# Patient Record
Sex: FEMALE | Race: WHITE | Employment: OTHER | URBAN - METROPOLITAN AREA
[De-identification: names, ages, dates, MRNs, and addresses within clinical notes are randomized per-mention and may not be internally consistent; named-entity substitution may affect disease eponyms.]

---

## 2017-01-04 DIAGNOSIS — M48.061 LUMBAR SPINAL STENOSIS: ICD-10-CM

## 2017-01-04 DIAGNOSIS — M54.16 RADICULOPATHY, LUMBAR REGION: ICD-10-CM

## 2017-01-04 DIAGNOSIS — M47.816 SPONDYLOSIS WITHOUT MYELOPATHY OR RADICULOPATHY, LUMBAR REGION: ICD-10-CM

## 2017-01-04 DIAGNOSIS — M51.36 OTHER INTERVERTEBRAL DISC DEGENERATION, LUMBAR REGION: ICD-10-CM

## 2017-01-04 RX ORDER — DULOXETIN HYDROCHLORIDE 20 MG/1
CAPSULE, DELAYED RELEASE ORAL
Qty: 90 CAP | Refills: 0 | Status: SHIPPED | OUTPATIENT
Start: 2017-01-04 | End: 2017-03-29 | Stop reason: SDUPTHER

## 2017-02-06 ENCOUNTER — OFFICE VISIT (OUTPATIENT)
Dept: ORTHOPEDIC SURGERY | Age: 77
End: 2017-02-06

## 2017-02-06 VITALS
RESPIRATION RATE: 22 BRPM | HEART RATE: 76 BPM | WEIGHT: 175 LBS | BODY MASS INDEX: 34.18 KG/M2 | SYSTOLIC BLOOD PRESSURE: 118 MMHG | DIASTOLIC BLOOD PRESSURE: 76 MMHG

## 2017-02-06 DIAGNOSIS — M48.061 SPINAL STENOSIS, LUMBAR: ICD-10-CM

## 2017-02-06 DIAGNOSIS — M47.816 SPONDYLOSIS WITHOUT MYELOPATHY OR RADICULOPATHY, LUMBAR REGION: ICD-10-CM

## 2017-02-06 DIAGNOSIS — M54.16 RADICULOPATHY, LUMBAR REGION: ICD-10-CM

## 2017-02-06 DIAGNOSIS — M51.36 OTHER INTERVERTEBRAL DISC DEGENERATION, LUMBAR REGION: Primary | ICD-10-CM

## 2017-02-06 RX ORDER — DULOXETIN HYDROCHLORIDE 30 MG/1
30 CAPSULE, DELAYED RELEASE ORAL DAILY
Qty: 90 CAP | Refills: 0 | Status: SHIPPED | OUTPATIENT
Start: 2017-02-06

## 2017-02-06 NOTE — PROGRESS NOTES
[facility]  [address]  [address], [place]   Phone: [phone]  Fax: [phone]        PROGRESS NOTE      HISTORY OF PRESENT ILLNESS:  The patient is a 78 y.o. female and was seen today for follow up of chronic bilateral buttocks extending posteriorly into BLE (R>L) worse since March 2016. She states pain is exacerbated with ambulation and describes symptoms consistent with stenosis. Note from Dr. Ramakrishna Christensen dated 9/6/16 indicating patient was seen with c/o bilateral knee pain and described pain radiating from buttocks into bilateral knees. Dr. Ramakrishna Christensen started her on Neurontin and gave her a prescription for Percocet. Patient states she had an allergic reaction to PERCOCET. Patient reports discontinuing NEURONTIN 300 mg TID as she believes her pain increased with medication. Her pain subsided after d/c medication. She completed Medrol Dosepak with slight benefit. She states she is tolerating Cymbalta 20 mg with some relief. She also notes a slight increase in standing and walking tolerance. She denies hx of lumbar spinal surgery or blocks. She has not attended physical therapy/chiropractor. She denies hx of glaucoma, DM, stomach ulcers, or bleeding disorders. Patient denies change in bowel or bladder habits. Patient denies fever, weight loss, or skin changes. She is not a candidate for an MRI due to clip in her brain from surgery in 1998. Patient is LH dominant. Preliminary reading of lumbar plain films revealed dextrorotary scoliosis, apex L2 convex to the right. Grade 1 retrolisthesis of L3 on L4. Panlumbar degenerative disc disease. Anterior osteophytes at all levels of lumbar spine. No acute pathology identified. Lumbar spine CT dated 10/5/16 reviewed. Per report, no acute osseous finding. Interval progression of scoliotic curvature and asymmetric left-sided degenerative spondylosis L2,-L4 and right-sided degenerative spondylosis L4/5.  Severe canal stenosis L4/5, moderate canal stenosis L3/4, mild to moderate canal stenosis L2/3. Associated moderate right lateral recess stenosis L4/5, mild to moderate foraminal stenosis at these levels. Persistent severe asymmetric right-sided degenerative spondylosis L5/S1 with severe right foraminal stenosis. At her last clinical appointment, patient wished to continue current treatment plan. I provided patient with a refill of Cymbalta 20 mg. The patient returns today with pain location and distribution remain unchanged. She rates pain 1-7/10, elevated since her last visit (0-3/10). Despite her current pain level, she reports she is doing well overall. She continues with Cymbalta 20 mg daily with slight relief. Patient reports Cymbalta's benefits do not last throughout the day.  reviewed. Past Medical History   Diagnosis Date    Brain aneurysm     Chronic obstructive pulmonary disease (HCC)     Hypertension         Social History     Social History    Marital status:      Spouse name: N/A    Number of children: N/A    Years of education: N/A     Occupational History    Not on file. Social History Main Topics    Smoking status: Former Smoker    Smokeless tobacco: Never Used    Alcohol use No    Drug use: No    Sexual activity: Not on file     Other Topics Concern    Not on file     Social History Narrative       Current Outpatient Prescriptions   Medication Sig Dispense Refill    DULoxetine (CYMBALTA) 30 mg capsule Take 1 Cap by mouth daily. 90 Cap 0    DULoxetine (CYMBALTA) 20 mg capsule TAKE 1 CAPSULE EVERY DAY 90 Cap 0    rosuvastatin (CRESTOR) 5 mg tablet       amLODIPine (NORVASC) 5 mg tablet       umeclidinium (INCRUSE ELLIPTA) 62.5 mcg/actuation inhaler Take 1 Puff by inhalation daily.  FLUTICASONE/VILANTEROL (BREO ELLIPTA IN) Take  by inhalation.  albuterol (VENTOLIN HFA) 90 mcg/actuation inhaler Take  by inhalation.  rOPINIRole (REQUIP) 2 mg tablet Take  by mouth nightly.  omeprazole (PRILOSEC) 20 mg capsule Take 20 mg by mouth daily.  baclofen (LIORESAL) 10 mg tablet Take  by mouth three (3) times daily.  metoprolol tartrate (LOPRESSOR) 25 mg tablet Take  by mouth two (2) times a day.  furosemide (LASIX) 40 mg tablet Take  by mouth daily.  aspirin delayed-release 81 mg tablet Take  by mouth daily.  spironolactone (ALDACTONE) 25 mg tablet Take  by mouth daily.  losartan (COZAAR) 100 mg tablet Take 100 mg by mouth daily.  meloxicam (MOBIC) 15 mg tablet Take 15 mg by mouth daily.  gabapentin (NEURONTIN) 300 mg capsule Take 1 Cap by mouth three (3) times daily (with meals). 90 Cap 1    gabapentin (NEURONTIN) 100 mg capsule TAKE ONE CAPSULE BY MOUTH THREE TIMES DAILY 90 Cap 0    traMADol (ULTRAM) 50 mg tablet Take 1 Tab by mouth every six (6) hours as needed for Pain. Max Daily Amount: 200 mg. 40 Tab 0    oxyCODONE-acetaminophen (PERCOCET)  mg per tablet Take 1 Tab by mouth every six (6) hours as needed for Pain. Max Daily Amount: 4 Tabs. 40 Tab 0    atorvastatin (LIPITOR) 10 mg tablet Take  by mouth daily.  gabapentin (NEURONTIN) 100 mg capsule Take 1 Cap by mouth three (3) times daily. 90 Cap 0       Allergies   Allergen Reactions    Codeine Nausea Only    Phenobarbital Hives    Tetanus And Diphtheria Toxoids Swelling    Vicodin [Hydrocodone-Acetaminophen] Nausea Only          PHYSICAL EXAMINATION    Visit Vitals    /76    Pulse 76    Resp 22    Wt 175 lb (79.4 kg)    BMI 34.18 kg/m2       CONSTITUTIONAL: NAD, A&O x 3  SENSATION: Intact to light touch throughout  RANGE OF MOTION: The patient has full passive range of motion in all four extremities.   MOTOR:  Straight Leg Raise: Negative, bilateral               Hip Flex Knee Ext Knee Flex Ankle DF GTE Ankle PF Tone   Right +4/5 +4/5 +4/5 +4/5 +4/5 +4/5 +4/5   Left +4/5 +4/5 +4/5 +4/5 +4/5 +4/5 +4/5       ASSESSMENT   Alma Johnson was seen today for back pain.    Diagnoses and all orders for this visit:    Other intervertebral disc degeneration, lumbar region    Radiculopathy, lumbar region    Spondylosis without myelopathy or radiculopathy, lumbar region    Spinal stenosis, lumbar    Other orders  -     DULoxetine (CYMBALTA) 30 mg capsule; Take 1 Cap by mouth daily. IMPRESSION AND PLAN:  I will increase her Cymbalta to 30 mg per day. Patient advised to call the office if intolerant to higher dose. I explained to patient that receiving her medications through mail order will require a 90-day supply and since we are unsure if she will remain on Cymbalta 30 mg, it would be a better option to get this prescription through the pharmacy for a 1-month supply. Despite this, patient wishes to continue with mail order due to finances. I will see the patient back in 1 month's time or earlier if needed. Written by Rowena Rodrigues, as dictated by Darius Waldrop MD  I examined the patient, reviewed and agree with the note.

## 2017-02-06 NOTE — MR AVS SNAPSHOT
Visit Information Date & Time Provider Department Dept. Phone Encounter #  
 2/6/2017  9:05 AM Nasreen Thompson, [address] [facility] [place] [phone] [id number] Follow-up Instructions Return in about 4 weeks (around 3/6/2017). Upcoming Health Maintenance Date Due DTaP/Tdap/Td series (1 - Tdap) 6/11/1963 FOBT Q 1 YEAR AGE 50-75 6/11/1992 ZOSTER VACCINE AGE 60> 6/11/2002 GLAUCOMA SCREENING Q2Y 6/11/2007 OSTEOPOROSIS SCREENING (DEXA) 6/11/2007 Pneumococcal 65+ Low/Medium Risk (1 of 2 - PCV13) 6/11/2007 MEDICARE YEARLY EXAM 6/11/2007 INFLUENZA AGE 9 TO ADULT 8/1/2016 Allergies as of 2/6/2017  Review Complete On: 2/6/2017 By: Nasreen Thompson MD  
  
 Severity Noted Reaction Type Reactions Codeine  08/16/2016    Nausea Only Phenobarbital  08/16/2016    Hives Tetanus And Diphtheria Toxoids  08/16/2016    Swelling Vicodin [Hydrocodone-acetaminophen]  08/16/2016    Nausea Only Current Immunizations  Never Reviewed No immunizations on file. Not reviewed this visit You Were Diagnosed With   
  
 Codes Comments Other intervertebral disc degeneration, lumbar region    -  Primary ICD-10-CM: M51.36 
ICD-9-CM: 722.52 Radiculopathy, lumbar region     ICD-10-CM: M54.16 
ICD-9-CM: 724.4 Spondylosis without myelopathy or radiculopathy, lumbar region     ICD-10-CM: M47.816 ICD-9-CM: 721.3 Spinal stenosis, lumbar     ICD-10-CM: M48.06 
ICD-9-CM: 724.02 Vitals BP Pulse Resp Weight(growth percentile) BMI OB Status 118/76 76 22 175 lb (79.4 kg) 34.18 kg/m2 Menopause Smoking Status Former Smoker BMI and BSA Data Body Mass Index Body Surface Area  
 34.18 kg/m 2 1.83 m 2 Preferred Pharmacy Pharmacy Name Phone [place] [address] [place] [address] [phone] Your Updated Medication List  
  
   
 This list is accurate as of: 2/6/17  9:12 AM.  Always use your most recent med list. amLODIPine 5 mg tablet Commonly known as:  NORVASC  
  
 aspirin delayed-release 81 mg tablet Take  by mouth daily. atorvastatin 10 mg tablet Commonly known as:  LIPITOR Take  by mouth daily. baclofen 10 mg tablet Commonly known as:  LIORESAL Take  by mouth three (3) times daily. BREO ELLIPTA IN Take  by inhalation. * DULoxetine 20 mg capsule Commonly known as:  CYMBALTA TAKE 1 CAPSULE EVERY DAY  
  
 * DULoxetine 30 mg capsule Commonly known as:  CYMBALTA Take 1 Cap by mouth daily. furosemide 40 mg tablet Commonly known as:  LASIX Take  by mouth daily. * gabapentin 100 mg capsule Commonly known as:  NEURONTIN Take 1 Cap by mouth three (3) times daily. * gabapentin 100 mg capsule Commonly known as:  NEURONTIN  
TAKE ONE CAPSULE BY MOUTH THREE TIMES DAILY * gabapentin 300 mg capsule Commonly known as:  NEURONTIN Take 1 Cap by mouth three (3) times daily (with meals). INCRUSE ELLIPTA 62.5 mcg/actuation inhaler Generic drug:  umeclidinium Take 1 Puff by inhalation daily. losartan 100 mg tablet Commonly known as:  COZAAR Take 100 mg by mouth daily. meloxicam 15 mg tablet Commonly known as:  MOBIC Take 15 mg by mouth daily. metoprolol tartrate 25 mg tablet Commonly known as:  LOPRESSOR Take  by mouth two (2) times a day. omeprazole 20 mg capsule Commonly known as:  PRILOSEC Take 20 mg by mouth daily. oxyCODONE-acetaminophen  mg per tablet Commonly known as:  PERCOCET Take 1 Tab by mouth every six (6) hours as needed for Pain. Max Daily Amount: 4 Tabs. rOPINIRole 2 mg tablet Commonly known as:  Gene Charnley Take  by mouth nightly. rosuvastatin 5 mg tablet Commonly known as:  CRESTOR  
  
 spironolactone 25 mg tablet Commonly known as:  ALDACTONE  
 Take  by mouth daily. traMADol 50 mg tablet Commonly known as:  ULTRAM  
Take 1 Tab by mouth every six (6) hours as needed for Pain. Max Daily Amount: 200 mg. VENTOLIN HFA 90 mcg/actuation inhaler Generic drug:  albuterol Take  by inhalation. * Notice: This list has 5 medication(s) that are the same as other medications prescribed for you. Read the directions carefully, and ask your doctor or other care provider to review them with you. Prescriptions Sent to Pharmacy Refills DULoxetine (CYMBALTA) 30 mg capsule 0 Sig: Take 1 Cap by mouth daily. Class: Normal  
 Pharmacy: [address] [place] [address], [address] [place] [address] #: [phone] Route: Oral  
  
Follow-up Instructions Return in about 4 weeks (around 3/6/2017). Introducing [facility]! [facility] introduces [employer] patient portal. Now you can access parts of your medical record, email your doctor's office, and request medication refills online. 1. In your internet browser, go to [email or web address] 2. Click on the First Time User? Click Here link in the Sign In box. You will see the New Member Sign Up page. 3. Enter your [employer] Access Code exactly as it appears below. You will not need to use this code after youve completed the sign-up process. If you do not sign up before the expiration date, you must request a new code. · [employer] Access Code: [email or web address] Expires: 5/7/2017  8:33 AM 
 
4. Enter the last four digits of your Social Security Number (xxxx) and Date of Birth (mm/dd/yyyy) as indicated and click Submit. You will be taken to the next sign-up page. 5. Create a [employer] ID. This will be your [employer] login ID and cannot be changed, so think of one that is secure and easy to remember. 6. Create a [employer] password. You can change your password at any time. 7. Enter your Password Reset Question and Answer.  This can be used at a later time if you forget your password. 8. Enter your e-mail address. You will receive e-mail notification when new information is available in [address] 9. Click Sign Up. You can now view and download portions of your medical record. 10. Click the Download Summary menu link to download a portable copy of your medical information. If you have questions, please visit the Frequently Asked Questions section of the [facility] website. Remember, [facility] is NOT to be used for urgent needs. For medical emergencies, dial 911. Now available from your iPhone and Android! Please provide this summary of care documentation to your next provider. Your primary care clinician is listed as Gissel Monaco. If you have any questions after today's visit, please call [phone].

## 2017-03-29 ENCOUNTER — OFFICE VISIT (OUTPATIENT)
Dept: ORTHOPEDIC SURGERY | Age: 77
End: 2017-03-29

## 2017-03-29 VITALS
SYSTOLIC BLOOD PRESSURE: 121 MMHG | HEIGHT: 60 IN | HEART RATE: 66 BPM | WEIGHT: 179 LBS | DIASTOLIC BLOOD PRESSURE: 73 MMHG | BODY MASS INDEX: 35.14 KG/M2

## 2017-03-29 DIAGNOSIS — M47.816 SPONDYLOSIS WITHOUT MYELOPATHY OR RADICULOPATHY, LUMBAR REGION: ICD-10-CM

## 2017-03-29 DIAGNOSIS — M51.36 OTHER INTERVERTEBRAL DISC DEGENERATION, LUMBAR REGION: Primary | ICD-10-CM

## 2017-03-29 DIAGNOSIS — M54.16 RADICULOPATHY, LUMBAR REGION: ICD-10-CM

## 2017-03-29 DIAGNOSIS — M48.061 SPINAL STENOSIS, LUMBAR: ICD-10-CM

## 2017-03-29 RX ORDER — DULOXETIN HYDROCHLORIDE 30 MG/1
30 CAPSULE, DELAYED RELEASE ORAL DAILY
Qty: 90 CAP | Refills: 0 | Status: SHIPPED | OUTPATIENT
Start: 2017-03-29

## 2017-03-29 NOTE — PROGRESS NOTES
[facility]  [address], [place] [address] [place] [address]  Phone: [phone]  Fax: [phone]        PROGRESS NOTE      HISTORY OF PRESENT ILLNESS:  The patient is a 78 y.o. female and was seen today for follow up of chronic bilateral buttocks extending posteriorly into BLE (R>L) worse since March 2016. She states pain is exacerbated with ambulation and describes symptoms consistent for stenosis. Note from Dr. Tammy Damon dated 9/6/16 indicating patient was seen with c/o bilateral knee pain and described pain radiating from buttocks into bilateral knees. Pt states she had an allergic reaction to PERCOCET. Pt reports discontinuing NEURONTIN 300 mg TID as she believes her pain increased with medication. Her pain subsided after d/c medication. She completed Medrol Dosepak with slight benefit. She denies hx of lumbar spinal surgery or blocks. She has not attended physical therapy/chiropractor. She denies hx of glaucoma, DM, stomach ulcers, or bleeding disorders. Pt denies change in bowel or bladder habits. Pt denies fever, weight loss, or skin changes. She is not a candidate for an MRI due to clip in her brain from surgery in 1998. Pt is LHD. Preliminary reading of lumbar plain films revealed dextrorotary scoliosis, apex L2 convex to the right. Grade 1 retrolisthesis of L3 on L4. Panlumbar degenerative disc disease. Anterior osteophytes at all levels of lumbar spine. No acute pathology identified. Lumbar spine CT dated 10/5/16 reviewed. Per report, no acute osseous finding. Interval progression of scoliotic curvature and asymmetric left-sided degenerative spondylosis L2,-L4 and right-sided degenerative spondylosis L4/5. Severe canal stenosis L4/5, moderate canal stenosis L3/4, mild to moderate canal stenosis L2/3. Associated moderate right lateral recess stenosis L4/5, mild to moderate foraminal stenosis at these levels.  Persistent severe asymmetric right-sided degenerative spondylosis L5/S1 with severe right foraminal stenosis. At her last clinical appointment, I increased her Cymbalta to 30 mg per day. I explained to patient that receiving her medications through mail order would require a 90-day supply and since we were unsure if she would remain on Cymbalta 30 mg, it would be a better option to get this prescription through the pharmacy for a 1-month supply. Despite this, patient wished to continue with mail order due to finances. The patient returns today with pain location and distribution remain unchanged. She rates pain 1-8/10, consistent with her last visit (1-7/10). Pt is tolerating increased dose of Cymbalta 30 mg per day with improvement in symptoms. Pt reports a ground-level fall on 3/19/17 in her kitchen and describes generalized weakness including her upper extremities (L>R), resulting in a syncopal episode. Pt believes she had TIA. She denies difficulty with speech. Since her fall, she reports onset of LUE pain which is exacerbated with overhead activity. Pt denies h/o DM.  reviewed. Past Medical History:   Diagnosis Date    Brain aneurysm     Chronic obstructive pulmonary disease (HCC)     Hypertension         Social History     Social History    Marital status:      Spouse name: N/A    Number of children: N/A    Years of education: N/A     Occupational History    Not on file. Social History Main Topics    Smoking status: Former Smoker    Smokeless tobacco: Never Used    Alcohol use No    Drug use: No    Sexual activity: Not on file     Other Topics Concern    Not on file     Social History Narrative       Current Outpatient Prescriptions   Medication Sig Dispense Refill    DULoxetine (CYMBALTA) 30 mg capsule Take 1 Cap by mouth daily. 90 Cap 0    DULoxetine (CYMBALTA) 30 mg capsule Take 1 Cap by mouth daily.  90 Cap 0    rosuvastatin (CRESTOR) 5 mg tablet       amLODIPine (NORVASC) 5 mg tablet       umeclidinium (INCRUSE ELLIPTA) 62.5 mcg/actuation inhaler Take 1 Puff by inhalation daily.  FLUTICASONE/VILANTEROL (BREO ELLIPTA IN) Take  by inhalation.  albuterol (VENTOLIN HFA) 90 mcg/actuation inhaler Take  by inhalation.  rOPINIRole (REQUIP) 2 mg tablet Take  by mouth nightly.  omeprazole (PRILOSEC) 20 mg capsule Take 20 mg by mouth daily.  baclofen (LIORESAL) 10 mg tablet Take  by mouth three (3) times daily.  metoprolol tartrate (LOPRESSOR) 25 mg tablet Take  by mouth two (2) times a day.  furosemide (LASIX) 40 mg tablet Take  by mouth daily.  aspirin delayed-release 81 mg tablet Take  by mouth daily.  spironolactone (ALDACTONE) 25 mg tablet Take  by mouth daily.  losartan (COZAAR) 100 mg tablet Take 100 mg by mouth daily.  meloxicam (MOBIC) 15 mg tablet Take 15 mg by mouth daily.  gabapentin (NEURONTIN) 300 mg capsule Take 1 Cap by mouth three (3) times daily (with meals). 90 Cap 1    gabapentin (NEURONTIN) 100 mg capsule TAKE ONE CAPSULE BY MOUTH THREE TIMES DAILY 90 Cap 0    traMADol (ULTRAM) 50 mg tablet Take 1 Tab by mouth every six (6) hours as needed for Pain. Max Daily Amount: 200 mg. 40 Tab 0    oxyCODONE-acetaminophen (PERCOCET)  mg per tablet Take 1 Tab by mouth every six (6) hours as needed for Pain. Max Daily Amount: 4 Tabs. 40 Tab 0    atorvastatin (LIPITOR) 10 mg tablet Take  by mouth daily.  gabapentin (NEURONTIN) 100 mg capsule Take 1 Cap by mouth three (3) times daily. 90 Cap 0       Allergies   Allergen Reactions    Codeine Nausea Only    Phenobarbital Hives    Tetanus And Diphtheria Toxoids Swelling    Vicodin [Hydrocodone-Acetaminophen] Nausea Only          PHYSICAL EXAMINATION    Visit Vitals    /73    Pulse 66    Ht 5' (1.524 m)    Wt 179 lb (81.2 kg)    BMI 34.96 kg/m2       CONSTITUTIONAL: NAD, A&O x 3  SENSATION: Intact to light touch throughout  NEURO: Sabrina's is negative bilaterally.   RANGE OF MOTION: The patient has full passive range of motion in all four extremities. MOTOR:  Straight Leg Raise: Negative, bilateral   MUSCULOSKELETAL: Increased tenderness to palpation of left bicipital tendon. Pain exacerbated with overhead activity. Shoulder AB/Flex Elbow Flex Wrist Ext Elbow Ext Wrist Flex Hand Intrin Tone   Right +4/5 +4/5 +4/5 +4/5 +4/5 +4/5 +4/5   Left +4/5 +4/5 4/5 4/5 4/5 +4/5 +4/5              Hip Flex Knee Ext Knee Flex Ankle DF GTE Ankle PF Tone   Right +4/5 +4/5 +4/5 +4/5 +4/5 +4/5 +4/5   Left +4/5 +4/5 +4/5 +4/5 +4/5 +4/5 +4/5       ASSESSMENT   Mari Todd was seen today for follow-up. Diagnoses and all orders for this visit:    Other intervertebral disc degeneration, lumbar region  -     REFERRAL TO FAMILY PRACTICE  -     REFERRAL TO ORTHOPEDICS    Radiculopathy, lumbar region  -     REFERRAL TO FAMILY PRACTICE  -     REFERRAL TO ORTHOPEDICS    Spondylosis without myelopathy or radiculopathy, lumbar region  -     REFERRAL TO FAMILY PRACTICE  -     REFERRAL TO ORTHOPEDICS    Spinal stenosis, lumbar  -     REFERRAL TO FAMILY PRACTICE  -     REFERRAL TO ORTHOPEDICS    Other orders  -     DULoxetine (CYMBALTA) 30 mg capsule; Take 1 Cap by mouth daily. IMPRESSION AND PLAN:  Her LUE pain appears to more likely related to shoulder pathology as opposed to cervical spinal pathology. I will refer her back to Dr. Tammy Damon for further evaluation. I will also refer her to her to Dr. Daja Vargas for further evaluation of possible TIA with syncopal episode. I will refill her Cymbalta at 30 mg per day. I will see the patient back in 1 month's time or earlier if needed. Written by Echo Toledo, as dictated by Vani Redding MD  I examined the patient, reviewed and agree with the note.

## 2017-03-29 NOTE — MR AVS SNAPSHOT
Visit Information Date & Time Provider Department Dept. Phone Encounter #  
 3/29/2017 11:00 AM Iftikhar Green MD [address] [facility] [place] [phone] [id number] Follow-up Instructions Return in about 4 weeks (around 4/26/2017). Upcoming Health Maintenance Date Due DTaP/Tdap/Td series (1 - Tdap) 6/11/1963 FOBT Q 1 YEAR AGE 50-75 6/11/1992 ZOSTER VACCINE AGE 60> 6/11/2002 GLAUCOMA SCREENING Q2Y 6/11/2007 OSTEOPOROSIS SCREENING (DEXA) 6/11/2007 Pneumococcal 65+ Low/Medium Risk (1 of 2 - PCV13) 6/11/2007 MEDICARE YEARLY EXAM 6/11/2007 INFLUENZA AGE 9 TO ADULT 8/1/2016 Allergies as of 3/29/2017  Review Complete On: 3/29/2017 By: Iftikhar Green MD  
  
 Severity Noted Reaction Type Reactions Codeine  08/16/2016    Nausea Only Phenobarbital  08/16/2016    Hives Tetanus And Diphtheria Toxoids  08/16/2016    Swelling Vicodin [Hydrocodone-acetaminophen]  08/16/2016    Nausea Only Current Immunizations  Never Reviewed No immunizations on file. Not reviewed this visit You Were Diagnosed With   
  
 Codes Comments Other intervertebral disc degeneration, lumbar region    -  Primary ICD-10-CM: M51.36 
ICD-9-CM: 722.52 Radiculopathy, lumbar region     ICD-10-CM: M54.16 
ICD-9-CM: 724.4 Spondylosis without myelopathy or radiculopathy, lumbar region     ICD-10-CM: M47.816 ICD-9-CM: 721.3 Spinal stenosis, lumbar     ICD-10-CM: M48.06 
ICD-9-CM: 724.02 Vitals BP Pulse Height(growth percentile) Weight(growth percentile) BMI OB Status 121/73 66 5' (1.524 m) 179 lb (81.2 kg) 34.96 kg/m2 Menopause Smoking Status Former Smoker Vitals History BMI and BSA Data Body Mass Index Body Surface Area 34.96 kg/m 2 1.85 m 2 Preferred Pharmacy Pharmacy Name Phone  [address], [address] [phone] Your Updated Medication List  
  
   
This list is accurate as of: 3/29/17 12:15 PM.  Always use your most recent med list. amLODIPine 5 mg tablet Commonly known as:  NORVASC  
  
 aspirin delayed-release 81 mg tablet Take  by mouth daily. atorvastatin 10 mg tablet Commonly known as:  LIPITOR Take  by mouth daily. baclofen 10 mg tablet Commonly known as:  LIORESAL Take  by mouth three (3) times daily. BREO ELLIPTA IN Take  by inhalation. * DULoxetine 30 mg capsule Commonly known as:  CYMBALTA Take 1 Cap by mouth daily. * DULoxetine 30 mg capsule Commonly known as:  CYMBALTA Take 1 Cap by mouth daily. furosemide 40 mg tablet Commonly known as:  LASIX Take  by mouth daily. * gabapentin 100 mg capsule Commonly known as:  NEURONTIN Take 1 Cap by mouth three (3) times daily. * gabapentin 100 mg capsule Commonly known as:  NEURONTIN  
TAKE ONE CAPSULE BY MOUTH THREE TIMES DAILY * gabapentin 300 mg capsule Commonly known as:  NEURONTIN Take 1 Cap by mouth three (3) times daily (with meals). INCRUSE ELLIPTA 62.5 mcg/actuation inhaler Generic drug:  umeclidinium Take 1 Puff by inhalation daily. losartan 100 mg tablet Commonly known as:  COZAAR Take 100 mg by mouth daily. meloxicam 15 mg tablet Commonly known as:  MOBIC Take 15 mg by mouth daily. metoprolol tartrate 25 mg tablet Commonly known as:  LOPRESSOR Take  by mouth two (2) times a day. omeprazole 20 mg capsule Commonly known as:  PRILOSEC Take 20 mg by mouth daily. oxyCODONE-acetaminophen  mg per tablet Commonly known as:  PERCOCET Take 1 Tab by mouth every six (6) hours as needed for Pain. Max Daily Amount: 4 Tabs. rOPINIRole 2 mg tablet Commonly known as:  Noreen Schmackke Take  by mouth nightly. rosuvastatin 5 mg tablet Commonly known as:  CRESTOR  
  
 spironolactone 25 mg tablet Commonly known as:  ALDACTONE Take  by mouth daily. traMADol 50 mg tablet Commonly known as:  ULTRAM  
Take 1 Tab by mouth every six (6) hours as needed for Pain. Max Daily Amount: 200 mg. VENTOLIN HFA 90 mcg/actuation inhaler Generic drug:  albuterol Take  by inhalation. * Notice: This list has 5 medication(s) that are the same as other medications prescribed for you. Read the directions carefully, and ask your doctor or other care provider to review them with you. Prescriptions Sent to Pharmacy Refills DULoxetine (CYMBALTA) 30 mg capsule 0 Sig: Take 1 Cap by mouth daily. Class: Normal  
 Pharmacy: [address] [place] [address], [address] [place] [address] #: [phone] Route: Oral  
  
We Performed the Following REFERRAL TO FAMILY PRACTICE [GDR23 Custom] Comments:  
 Dr. Bennett Alvarenga has walk in hours today form [facility]. Patient states that she will go there today. REFERRAL TO ORTHOPEDICS [IGL381 Custom] Comments:  
 Left shoulder pain Follow-up Instructions Return in about 4 weeks (around 4/26/2017). Referral Information Referral ID Referred By Referred To  
  
 [id number] FELECIA ADAMS III Not Available Visits Status Start Date End Date 1 New Request 3/29/17 3/29/18 If your referral has a status of pending review or denied, additional information will be sent to support the outcome of this decision. Referral ID Referred By Referred To  
 [id number] [facility], [address]  [facility] MD Phil  
   [address] [address] [place], [address] Phone: [phone] Fax: [phone] Visits Status Start Date End Date 1 New Request 3/29/17 3/29/18 If your referral has a status of pending review or denied, additional information will be sent to support the outcome of this decision. Introducing [facility]! Stan Beebe introduces [employer] patient portal. Now you can access parts of your medical record, email your doctor's office, and request medication refills online. 1. In your internet browser, go to [email or web address] 2. Click on the First Time User? Click Here link in the Sign In box. You will see the New Member Sign Up page. 3. Enter your [email or web address] Access Code exactly as it appears below. You will not need to use this code after youve completed the sign-up process. If you do not sign up before the expiration date, you must request a new code. · [email or web address] Access Code: [email or web address] Expires: 5/7/2017  9:33 AM 
 
4. Enter the last four digits of your Social Security Number (xxxx) and Date of Birth (mm/dd/yyyy) as indicated and click Submit. You will be taken to the next sign-up page. 5. Create a [employer] ID. This will be your [employer] login ID and cannot be changed, so think of one that is secure and easy to remember. 6. Create a [employer] password. You can change your password at any time. 7. Enter your Password Reset Question and Answer. This can be used at a later time if you forget your password. 8. Enter your e-mail address. You will receive e-mail notification when new information is available in [address] 9. Click Sign Up. You can now view and download portions of your medical record. 10. Click the Download Summary menu link to download a portable copy of your medical information. If you have questions, please visit the Frequently Asked Questions section of the [employer] website. Remember, [employer] is NOT to be used for urgent needs. For medical emergencies, dial 911. Now available from your iPhone and Android! Please provide this summary of care documentation to your next provider. Your primary care clinician is listed as Randa Covarrubias. If you have any questions after today's visit, please call [phone].

## 2017-05-16 ENCOUNTER — OFFICE VISIT (OUTPATIENT)
Dept: ORTHOPEDIC SURGERY | Age: 77
End: 2017-05-16

## 2017-05-16 VITALS
BODY MASS INDEX: 33.96 KG/M2 | WEIGHT: 173 LBS | HEIGHT: 60 IN | DIASTOLIC BLOOD PRESSURE: 75 MMHG | HEART RATE: 78 BPM | SYSTOLIC BLOOD PRESSURE: 119 MMHG

## 2017-05-16 DIAGNOSIS — M25.512 LEFT SHOULDER PAIN, UNSPECIFIED CHRONICITY: ICD-10-CM

## 2017-05-16 DIAGNOSIS — M75.102 TEAR OF LEFT ROTATOR CUFF, UNSPECIFIED TEAR EXTENT: Primary | ICD-10-CM

## 2017-05-16 RX ORDER — TRIAMCINOLONE ACETONIDE 40 MG/ML
40 INJECTION, SUSPENSION INTRA-ARTICULAR; INTRAMUSCULAR ONCE
Qty: 1 ML | Refills: 0
Start: 2017-05-16 | End: 2017-05-16

## 2017-05-16 NOTE — PROGRESS NOTES
[facility]  1940   Chief Complaint   Patient presents with    Shoulder Pain     left        HISTORY OF PRESENT ILLNESS  Fabiana Kee is a 78 y.o. female who presents today for evaluation of left shoulder pain. She rates her pain 9/10 today. Patient was referred by Dr. Thu Calvert for further evaluation. She notes the pain has been present about 4-5 months but does not recall a particular injury. She notes the pain starts in her neck and radiates down to the elbow. She has pain with movement. She denies previous cortisone injections or previous treatment. She notes she has been having episodes of falling. She has congestive heart failure. She has attended PT. She is unable to have an MRI due to a clip in her brain. Allergies   Allergen Reactions    Codeine Nausea Only    Phenobarbital Hives    Tetanus And Diphtheria Toxoids Swelling    Vicodin [Hydrocodone-Acetaminophen] Nausea Only        Past Medical History:   Diagnosis Date    Brain aneurysm     Chronic obstructive pulmonary disease (HCC)     Hypertension       Social History     Social History    Marital status:      Spouse name: N/A    Number of children: N/A    Years of education: N/A     Occupational History    Not on file.      Social History Main Topics    Smoking status: Former Smoker    Smokeless tobacco: Never Used    Alcohol use No    Drug use: No    Sexual activity: Not on file     Other Topics Concern    Not on file     Social History Narrative      Past Surgical History:   Procedure Laterality Date    HX APPENDECTOMY      HX BREAST BIOPSY Left     HX CARPAL TUNNEL RELEASE Right     HX CHOLECYSTECTOMY      HX OTHER SURGICAL      BRAIN ANEURYSM    HX TONSIL AND ADENOIDECTOMY        Family History   Problem Relation Age of Onset    Cancer Mother     Heart Disease Father     Heart Attack Other       Current Outpatient Prescriptions   Medication Sig    DULoxetine (CYMBALTA) 30 mg capsule Take 1 Cap by mouth daily.  rosuvastatin (CRESTOR) 5 mg tablet     amLODIPine (NORVASC) 5 mg tablet     umeclidinium (INCRUSE ELLIPTA) 62.5 mcg/actuation inhaler Take 1 Puff by inhalation daily.  FLUTICASONE/VILANTEROL (BREO ELLIPTA IN) Take  by inhalation.  albuterol (VENTOLIN HFA) 90 mcg/actuation inhaler Take  by inhalation.  rOPINIRole (REQUIP) 2 mg tablet Take  by mouth nightly.  omeprazole (PRILOSEC) 20 mg capsule Take 20 mg by mouth daily.  baclofen (LIORESAL) 10 mg tablet Take  by mouth three (3) times daily.  metoprolol tartrate (LOPRESSOR) 25 mg tablet Take  by mouth two (2) times a day.  atorvastatin (LIPITOR) 10 mg tablet Take  by mouth daily.  furosemide (LASIX) 40 mg tablet Take  by mouth daily.  aspirin delayed-release 81 mg tablet Take  by mouth daily.  spironolactone (ALDACTONE) 25 mg tablet Take  by mouth daily.  losartan (COZAAR) 100 mg tablet Take 100 mg by mouth daily.  DULoxetine (CYMBALTA) 30 mg capsule Take 1 Cap by mouth daily.  gabapentin (NEURONTIN) 300 mg capsule Take 1 Cap by mouth three (3) times daily (with meals).  gabapentin (NEURONTIN) 100 mg capsule TAKE ONE CAPSULE BY MOUTH THREE TIMES DAILY    traMADol (ULTRAM) 50 mg tablet Take 1 Tab by mouth every six (6) hours as needed for Pain. Max Daily Amount: 200 mg.    oxyCODONE-acetaminophen (PERCOCET)  mg per tablet Take 1 Tab by mouth every six (6) hours as needed for Pain. Max Daily Amount: 4 Tabs.  meloxicam (MOBIC) 15 mg tablet Take 15 mg by mouth daily.  gabapentin (NEURONTIN) 100 mg capsule Take 1 Cap by mouth three (3) times daily. No current facility-administered medications for this visit. REVIEW OF SYSTEM   Patient denies: Weight loss, Fever/Chills, HA, Visual changes, Fatigue, Chest pain, SOB, Abdominal pain, N/V/D/C, Blood in stool or urine, Edema. Pertinent positive as above in HPI.  All others were negative    PHYSICAL EXAM:   Visit Vitals    /75    Pulse 78    Ht 5' (1.524 m)    Wt 173 lb (78.5 kg)    BMI 33.79 kg/m2     The patient is a well-developed, well-nourished female   in no acute distress. The patient is alert and oriented times three. The patient is alert and oriented times three. Mood and affect are normal.  LYMPHATIC: lymph nodes are not enlarged and are within normal limits  SKIN: normal in color and non tender to palpation. There are no bruises or abrasions noted. NEUROLOGICAL: Motor sensory exam is within normal limits. Reflexes are equal bilaterally. There is normal sensation to pinprick and light touch  MUSCULOSKELETAL:  Examination Left shoulder   Skin Intact   AC joint tenderness -   Biceps tenderness -   Forward flexion/Elevation    Active abduction    Glenohumeral abduction 80   External rotation ROM 45   Internal rotation ROM 30   Apprehension -   Lindas Relocation -   Jerk -   Load and Shift -   Obriens -   Speeds -   Impingement sign +   Supraspinatus/Empty Can -, 5/5   External Rotation Strength -, 5/5   Lift Off/Belly Press -, 5/5   Neurovascular Intact          PROCEDURE: After sterile prep, 6 cc of Xylocaine and 1 cc of Kenalog were injected into the left shoulder.        [facility]  OFFICE PROCEDURE PROGRESS NOTE        Chart reviewed for the following:  Tran Durham MD, have reviewed the History, Physical and updated the Allergic reactions for [address] performed immediately prior to start of procedure:  Tran Durham MD, have performed the following reviews on [address] prior to the start of the procedure:            * Patient was identified by name and date of birth   * Agreement on procedure being performed was verified  * Risks and Benefits explained to the patient  * Procedure site verified and marked as necessary  * Patient was positioned for comfort  * Consent was signed and verified     Time: 3:06 PM    Date of procedure: 5/16/2017    Procedure performed by:  Veta Lennox, MD    Provider assisted by: (see medication administration)    How tolerated by patient: tolerated the procedure well with no complications    Comments: none      IMAGING:   XR of the left shoulder dated 5/16/17 was reviewed and read: Slight proximal migration of the humeral head. IMPRESSION:      ICD-10-CM ICD-9-CM    1. Left shoulder pain, unspecified chronicity M25.512 719.41 AMB POC XRAY, SHOULDER; COMPLETE, 2+        PLAN: Patient has been experiencing pain in the left shoulder for several months. After discussing treatment options, I injected the left shoulder with cortisone today. I instructed her to continue to move the shoulder to avoid further stiffness. We will also obtain a CT arthrogram of the left shoulder to r/o a rotator cuff tear. I will see her back following completion of the MRI. Office note will be sent to the referring provider. Follow-up Disposition: Not on File    Scribed by Maxine Mech [address]) as dictated by Veta Lennox, MD    I, Dr. Veta Lennox, confirm that all documentation is accurate.     Veta Lennox, M.D.   [facility]

## 2017-05-26 ENCOUNTER — OFFICE VISIT (OUTPATIENT)
Dept: ORTHOPEDIC SURGERY | Age: 77
End: 2017-05-26

## 2017-05-26 VITALS
HEART RATE: 67 BPM | BODY MASS INDEX: 34.24 KG/M2 | WEIGHT: 174.4 LBS | SYSTOLIC BLOOD PRESSURE: 128 MMHG | RESPIRATION RATE: 18 BRPM | HEIGHT: 60 IN | DIASTOLIC BLOOD PRESSURE: 67 MMHG

## 2017-05-26 DIAGNOSIS — M47.816 SPONDYLOSIS WITHOUT MYELOPATHY OR RADICULOPATHY, LUMBAR REGION: ICD-10-CM

## 2017-05-26 DIAGNOSIS — M51.36 OTHER INTERVERTEBRAL DISC DEGENERATION, LUMBAR REGION: Primary | ICD-10-CM

## 2017-05-26 DIAGNOSIS — M54.12 CERVICAL NEURITIS: ICD-10-CM

## 2017-05-26 DIAGNOSIS — M54.16 RADICULOPATHY, LUMBAR REGION: ICD-10-CM

## 2017-05-26 DIAGNOSIS — M48.061 SPINAL STENOSIS, LUMBAR: ICD-10-CM

## 2017-05-26 RX ORDER — DULOXETIN HYDROCHLORIDE 30 MG/1
30 CAPSULE, DELAYED RELEASE ORAL DAILY
Qty: 90 CAP | Refills: 0 | Status: SHIPPED | OUTPATIENT
Start: 2017-05-26

## 2017-05-26 NOTE — PROGRESS NOTES
[facility]  [address]  [address], [place]   Phone: [phone]  Fax: [phone]        PROGRESS NOTE      HISTORY OF PRESENT ILLNESS:  The patient is a 78 y.o. female and was seen today for follow up of chronic bilateral buttocks extending posteriorly into BLE (R>L) worse since March 2016. She states pain is exacerbated with ambulation and describes symptoms consistent for stenosis. Note from Dr. Sammy Gerber dated 9/6/16 indicating patient was seen with c/o bilateral knee pain and described pain radiating from buttocks into bilateral knees. Pt reports a ground-level fall on 3/19/17 in her kitchen and describes generalized weakness including her upper extremities (L>R), resulting in a syncopal episode. Pt believes she had TIA. She denies difficulty with speech. Since her fall, she reports onset of LUE pain which is exacerbated with overhead activity. Pt states she had an allergic reaction to PERCOCET. Pt reports discontinuing NEURONTIN 300 mg TID as she believes her pain increased with medication. Her pain subsided after d/c medication. She completed Medrol Dosepak with slight benefit. Pt is tolerating increased dose of Cymbalta 30 mg per day with improvement in symptoms. She denies hx of lumbar spinal surgery or blocks. She has not attended physical therapy/chiropractor. She denies hx of glaucoma, DM, stomach ulcers, or bleeding disorders. Pt denies change in bowel or bladder habits. Pt denies fever, weight loss, or skin changes. She is not a candidate for an MRI due to clip in her brain from surgery in 1998. Pt is LHD. Preliminary reading of lumbar plain films revealed dextrorotary scoliosis, apex L2 convex to the right. Grade 1 retrolisthesis of L3 on L4. Panlumbar degenerative disc disease. Anterior osteophytes at all levels of lumbar spine. No acute pathology identified. Lumbar spine CT dated 10/5/16 reviewed. Per report, no acute osseous finding.  Interval progression of scoliotic curvature and asymmetric left-sided degenerative spondylosis L2,-L4 and right-sided degenerative spondylosis L4/5. Severe canal stenosis L4/5, moderate canal stenosis L3/4, mild to moderate canal stenosis L2/3. Associated moderate right lateral recess stenosis L4/5, mild to moderate foraminal stenosis at these levels. Persistent severe asymmetric right-sided degenerative spondylosis L5/S1 with severe right foraminal stenosis. At her last clinical appointment, her LUE pain appeared likely more related to shoulder pathology as opposed to cervical spinal pathology. I referred her back to Dr. Barber Lake for further evaluation. I also referred her to her to Dr. Bishop Marin for further evaluation of possible TIA with syncopal episode. I refilled her Cymbalta at 30 mg per day. The patient returns today with chronic bilateral buttocks extending posteriorly into BLE (R>L). She rates low back/BLE pain 0/10, an improvement since her prior visit (1-8/10). Pt continues with Cymbalta 30 mg per day with good relief for her low back and BLE pain. Pt continues to have left shoulder pain but states it can extend below the elbow intermittently to the hand. She rates left shoulder pain 10/10. Note from Dr. Barber Lake dated 5/16/17 indicating patient was seen for evaluation of left shoulder pain. Of note, there was slight proximal migration of the humeral head by x-ray. At that time, a left shoulder injection was administered and a CT scan of the left shoulder was ordered. Pt reports no relief with left shoulder injection. She states she completed her left shoulder CT on 5/25/17 and plans to f/u with Dr. Barber Lake. Pt was recently hospitalized for COPD, CHF, bronchitis and UTI.  reviewed.        Past Medical History:   Diagnosis Date    Brain aneurysm     CHF (congestive heart failure) (HCC)     Chronic obstructive pulmonary disease (HCC)     Hypertension         Social History     Social History    Marital status:  Spouse name: N/A    Number of children: N/A    Years of education: N/A     Occupational History    Not on file. Social History Main Topics    Smoking status: Former Smoker    Smokeless tobacco: Never Used    Alcohol use No    Drug use: No    Sexual activity: Not on file     Other Topics Concern    Not on file     Social History Narrative       Current Outpatient Prescriptions   Medication Sig Dispense Refill    DULoxetine (CYMBALTA) 30 mg capsule Take 1 Cap by mouth daily. 90 Cap 0    DULoxetine (CYMBALTA) 30 mg capsule Take 1 Cap by mouth daily. 90 Cap 0    DULoxetine (CYMBALTA) 30 mg capsule Take 1 Cap by mouth daily. 90 Cap 0    rosuvastatin (CRESTOR) 5 mg tablet       amLODIPine (NORVASC) 5 mg tablet       umeclidinium (INCRUSE ELLIPTA) 62.5 mcg/actuation inhaler Take 1 Puff by inhalation daily.  FLUTICASONE/VILANTEROL (BREO ELLIPTA IN) Take  by inhalation.  albuterol (VENTOLIN HFA) 90 mcg/actuation inhaler Take  by inhalation.  rOPINIRole (REQUIP) 2 mg tablet Take  by mouth nightly.  omeprazole (PRILOSEC) 20 mg capsule Take 20 mg by mouth daily.  baclofen (LIORESAL) 10 mg tablet Take  by mouth three (3) times daily.  metoprolol tartrate (LOPRESSOR) 25 mg tablet Take  by mouth two (2) times a day.  atorvastatin (LIPITOR) 10 mg tablet Take  by mouth daily.  furosemide (LASIX) 40 mg tablet Take  by mouth daily.  aspirin delayed-release 81 mg tablet Take  by mouth daily.  spironolactone (ALDACTONE) 25 mg tablet Take  by mouth daily.  losartan (COZAAR) 100 mg tablet Take 100 mg by mouth daily.  meloxicam (MOBIC) 15 mg tablet Take 15 mg by mouth daily.  gabapentin (NEURONTIN) 300 mg capsule Take 1 Cap by mouth three (3) times daily (with meals).  90 Cap 1    gabapentin (NEURONTIN) 100 mg capsule TAKE ONE CAPSULE BY MOUTH THREE TIMES DAILY 90 Cap 0    traMADol (ULTRAM) 50 mg tablet Take 1 Tab by mouth every six (6) hours as needed for Pain. Max Daily Amount: 200 mg. 40 Tab 0    oxyCODONE-acetaminophen (PERCOCET)  mg per tablet Take 1 Tab by mouth every six (6) hours as needed for Pain. Max Daily Amount: 4 Tabs. 40 Tab 0    gabapentin (NEURONTIN) 100 mg capsule Take 1 Cap by mouth three (3) times daily. 90 Cap 0       Allergies   Allergen Reactions    Codeine Nausea Only    Phenobarbital Hives    Tetanus And Diphtheria Toxoids Swelling    Vicodin [Hydrocodone-Acetaminophen] Nausea Only          PHYSICAL EXAMINATION    Visit Vitals    /67 (BP 1 Location: Right arm, BP Patient Position: Sitting)    Pulse 67    Resp 18    Ht 5' (1.524 m)    Wt 174 lb 6.4 oz (79.1 kg)    BMI 34.06 kg/m2       CONSTITUTIONAL: NAD, A&O x 3  SENSATION: Intact to light touch throughout  RANGE OF MOTION: The patient has full passive range of motion in all four extremities. MOTOR:  Straight Leg Raise: Negative, bilateral               Hip Flex Knee Ext Knee Flex Ankle DF GTE Ankle PF Tone   Right +4/5 +4/5 +4/5 +4/5 +4/5 +4/5 +4/5   Left +4/5 +4/5 +4/5 +4/5 +4/5 +4/5 +4/5       ASSESSMENT   Juancarlos Valenzuela was seen today for back pain and leg pain. Diagnoses and all orders for this visit:    Other intervertebral disc degeneration, lumbar region    Radiculopathy, lumbar region    Spondylosis without myelopathy or radiculopathy, lumbar region    Spinal stenosis, lumbar    Cervical neuritis    Other orders  -     DULoxetine (CYMBALTA) 30 mg capsule; Take 1 Cap by mouth daily. IMPRESSION AND PLAN:  She continues to demonstrate mechanical left shoulder pain. Patient also reports her left shoulder pain can intermittently extend distally to the hand. Some of her symptoms possibly may be coming from cervical spinal pathology. Davidson Couch will await the results of her left shoulder CT prior to proceeding with workup to evaluate for cervical spinal pathology. Her lumbar radiculopathy is well-controlled on Cymbalta 30 mg.  She was provided with refills of her Cymbalta 30 mg today. I will see the patient back in 1 month's time or earlier if needed. Written by Bessie Grant, as dictated by Marie Borrego MD  I examined the patient, reviewed and agree with the note.

## 2017-05-26 NOTE — MR AVS SNAPSHOT
Visit Information Date & Time Provider Department Dept. Phone Encounter #  
 5/26/2017  9:50 AM Lalita Aguilar  [facility], [address] and [facility] - [place] [phone] [id number] Follow-up Instructions Return in about 4 weeks (around 6/23/2017). Your Appointments 5/30/2017  1:20 PM  
DIAG TEST F/U with Sujata Collazo MD  
[address]14 [facility], [address] and [facility] - [place] (Vannessa Eaton) Appt Note: review arthrogram/ct lt shoulder obici 05/25/17 2012 [place] [address] [facility] [address]  
[phone]  
  
   
 2012 270 [facility] Upcoming Health Maintenance Date Due DTaP/Tdap/Td series (1 - Tdap) 6/11/1963 FOBT Q 1 YEAR AGE 50-75 6/11/1992 ZOSTER VACCINE AGE 60> 6/11/2002 GLAUCOMA SCREENING Q2Y 6/11/2007 OSTEOPOROSIS SCREENING (DEXA) 6/11/2007 Pneumococcal 65+ Low/Medium Risk (1 of 2 - PCV13) 6/11/2007 MEDICARE YEARLY EXAM 6/11/2007 INFLUENZA AGE 9 TO ADULT 8/1/2017 Allergies as of 5/26/2017  Review Complete On: 5/26/2017 By: Lalita Aguilar MD  
  
 Severity Noted Reaction Type Reactions Codeine  08/16/2016    Nausea Only Phenobarbital  08/16/2016    Hives Tetanus And Diphtheria Toxoids  08/16/2016    Swelling Vicodin [Hydrocodone-acetaminophen]  08/16/2016    Nausea Only Current Immunizations  Never Reviewed No immunizations on file. Not reviewed this visit You Were Diagnosed With   
  
 Codes Comments Other intervertebral disc degeneration, lumbar region    -  Primary ICD-10-CM: M51.36 
ICD-9-CM: 722.52 Radiculopathy, lumbar region     ICD-10-CM: M54.16 
ICD-9-CM: 724.4 Spondylosis without myelopathy or radiculopathy, lumbar region     ICD-10-CM: M47.816 ICD-9-CM: 721.3 Spinal stenosis, lumbar     ICD-10-CM: M48.06 
ICD-9-CM: 724.02 Cervical neuritis     ICD-10-CM: M54.12 
ICD-9-CM: 723.4 Vitals BP Pulse Resp Height(growth percentile) Weight(growth percentile) BMI  
 128/67 (BP 1 Location: Right arm, BP Patient Position: Sitting) 67 18 5' (1.524 m) 174 lb 6.4 oz (79.1 kg) 34.06 kg/m2 OB Status Smoking Status Menopause Former Smoker Vitals History BMI and BSA Data Body Mass Index Body Surface Area 34.06 kg/m 2 1.83 m 2 Preferred Pharmacy Pharmacy Name Phone [facility] [address] [place], [place] [address] [address] [phone] Your Updated Medication List  
  
   
This list is accurate as of: 5/26/17 10:01 AM.  Always use your most recent med list. amLODIPine 5 mg tablet Commonly known as:  NORVASC  
  
 aspirin delayed-release 81 mg tablet Take  by mouth daily. atorvastatin 10 mg tablet Commonly known as:  LIPITOR Take  by mouth daily. baclofen 10 mg tablet Commonly known as:  LIORESAL Take  by mouth three (3) times daily. BREO ELLIPTA IN Take  by inhalation. * DULoxetine 30 mg capsule Commonly known as:  CYMBALTA Take 1 Cap by mouth daily. * DULoxetine 30 mg capsule Commonly known as:  CYMBALTA Take 1 Cap by mouth daily. * DULoxetine 30 mg capsule Commonly known as:  CYMBALTA Take 1 Cap by mouth daily. furosemide 40 mg tablet Commonly known as:  LASIX Take  by mouth daily. * gabapentin 100 mg capsule Commonly known as:  NEURONTIN Take 1 Cap by mouth three (3) times daily. * gabapentin 100 mg capsule Commonly known as:  NEURONTIN  
TAKE ONE CAPSULE BY MOUTH THREE TIMES DAILY * gabapentin 300 mg capsule Commonly known as:  NEURONTIN Take 1 Cap by mouth three (3) times daily (with meals). INCRUSE ELLIPTA 62.5 mcg/actuation inhaler Generic drug:  umeclidinium Take 1 Puff by inhalation daily. losartan 100 mg tablet Commonly known as:  COZAAR Take 100 mg by mouth daily. meloxicam 15 mg tablet Commonly known as:  MOBIC Take 15 mg by mouth daily. metoprolol tartrate 25 mg tablet Commonly known as:  LOPRESSOR Take  by mouth two (2) times a day. omeprazole 20 mg capsule Commonly known as:  PRILOSEC Take 20 mg by mouth daily. oxyCODONE-acetaminophen  mg per tablet Commonly known as:  PERCOCET Take 1 Tab by mouth every six (6) hours as needed for Pain. Max Daily Amount: 4 Tabs. rOPINIRole 2 mg tablet Commonly known as:  Ofilia Baize Take  by mouth nightly. rosuvastatin 5 mg tablet Commonly known as:  CRESTOR  
  
 spironolactone 25 mg tablet Commonly known as:  ALDACTONE Take  by mouth daily. traMADol 50 mg tablet Commonly known as:  ULTRAM  
Take 1 Tab by mouth every six (6) hours as needed for Pain. Max Daily Amount: 200 mg. VENTOLIN HFA 90 mcg/actuation inhaler Generic drug:  albuterol Take  by inhalation. * Notice: This list has 6 medication(s) that are the same as other medications prescribed for you. Read the directions carefully, and ask your doctor or other care provider to review them with you. Prescriptions Sent to Pharmacy Refills DULoxetine (CYMBALTA) 30 mg capsule 0 Sig: Take 1 Cap by mouth daily. Class: Normal  
 Pharmacy: [address] [place] [address], [address] [place] [address] Ph #: [phone] Route: Oral  
  
Follow-up Instructions Return in about 4 weeks (around 6/23/2017). Introducing [facility] & HEALTH SERVICES! Esau Jones introduces [employer] patient portal. Now you can access parts of your medical record, email your doctor's office, and request medication refills online. 1. In your internet browser, go to [email or web address] 2. Click on the First Time User? Click Here link in the Sign In box. You will see the New Member Sign Up page. 3. Enter your [employer] Access Code exactly as it appears below.  You will not need to use this code after youve completed the sign-up process. If you do not sign up before the expiration date, you must request a new code. · [employer] Access Code: [email or web address] Expires: 8/14/2017  2:35 PM 
 
4. Enter the last four digits of your Social Security Number (xxxx) and Date of Birth (mm/dd/yyyy) as indicated and click Submit. You will be taken to the next sign-up page. 5. Create a [employer] ID. This will be your [employer] login ID and cannot be changed, so think of one that is secure and easy to remember. 6. Create a [employer] password. You can change your password at any time. 7. Enter your Password Reset Question and Answer. This can be used at a later time if you forget your password. 8. Enter your e-mail address. You will receive e-mail notification when new information is available in [address] 9. Click Sign Up. You can now view and download portions of your medical record. 10. Click the Download Summary menu link to download a portable copy of your medical information. If you have questions, please visit the Frequently Asked Questions section of the [employer] website. Remember, [employer] is NOT to be used for urgent needs. For medical emergencies, dial 911. Now available from your iPhone and Android! Please provide this summary of care documentation to your next provider. Your primary care clinician is listed as Alexandre Tirado. If you have any questions after today's visit, please call [phone].

## 2017-05-30 ENCOUNTER — OFFICE VISIT (OUTPATIENT)
Dept: ORTHOPEDIC SURGERY | Age: 77
End: 2017-05-30

## 2017-05-30 VITALS
HEIGHT: 60 IN | BODY MASS INDEX: 33.77 KG/M2 | DIASTOLIC BLOOD PRESSURE: 90 MMHG | HEART RATE: 77 BPM | WEIGHT: 172 LBS | SYSTOLIC BLOOD PRESSURE: 141 MMHG | TEMPERATURE: 98.6 F

## 2017-05-30 DIAGNOSIS — M75.02 ADHESIVE CAPSULITIS OF LEFT SHOULDER: ICD-10-CM

## 2017-05-30 DIAGNOSIS — M67.922 BICEPS TENDINOPATHY, LEFT: Primary | ICD-10-CM

## 2017-05-30 RX ORDER — TRIAMCINOLONE ACETONIDE 40 MG/ML
20 INJECTION, SUSPENSION INTRA-ARTICULAR; INTRAMUSCULAR ONCE
Qty: 0.5 ML | Refills: 0
Start: 2017-05-30 | End: 2017-05-30

## 2017-05-30 NOTE — PROGRESS NOTES
Priyanka Pretty [facility]  1940   Chief Complaint   Patient presents with    Shoulder Pain     Left        HISTORY OF PRESENT ILLNESS  Siena Ross is a 78 y.o. female who presents today for reevaluation of left shoulder pain and to review her CT results. She rates her pain 5/10 today. At her last office visit, she received a cortisone injection in the shoulder. She reports receiving no relief from the cortisone injection. She notes the pain has been present about 4-5 months but does not recall a particular injury. She notes the pain starts in her neck and radiates down to the elbow. She has pain with movement. She denies previous cortisone injections or previous treatment. She notes she has been having episodes of falling. She has congestive heart failure. She has attended PT. She is unable to have an MRI due to a clip in her brain. Patient denies any fever, chills, chest pain, shortness of breath or calf pain. There are no new illness or injuries to report since last seen in the office. There are no changes to medications, allergies, family or social history. PHYSICAL EXAM:   Visit Vitals    /90    Pulse 77    Temp 98.6 °F (37 °C) (Oral)    Ht 5' (1.524 m)    Wt 172 lb (78 kg)    BMI 33.59 kg/m2     The patient is a well-developed, well-nourished female   in no acute distress. The patient is alert and oriented times three. The patient is alert and oriented times three. Mood and affect are normal.  LYMPHATIC: lymph nodes are not enlarged and are within normal limits  SKIN: normal in color and non tender to palpation. There are no bruises or abrasions noted. NEUROLOGICAL: Motor sensory exam is within normal limits. Reflexes are equal bilaterally.  There is normal sensation to pinprick and light touch  MUSCULOSKELETAL:  Examination Left shoulder   Skin Intact   AC joint tenderness -   Biceps tenderness -   Forward flexion/Elevation    Active abduction    Glenohumeral abduction 80 External rotation ROM 45   Internal rotation ROM 30   Apprehension -   Lindas Relocation -   Jerk -   Load and Shift -   Obriens -   Speeds -   Impingement sign +   Supraspinatus/Empty Can -, 5/5   External Rotation Strength -, 5/5   Lift Off/Belly Press -, 5/5   Neurovascular Intact        PROCEDURE: After sterile prep, 0.5 cc of Xylocaine and 0.5 cc of Kenalog were injected into the left biceps tendon. [facility] - [facility]  OFFICE PROCEDURE PROGRESS NOTE        Chart reviewed for the following:  Joe Serrano MD, have reviewed the History, Physical and updated the Allergic reactions for [address] performed immediately prior to start of procedure:  Joe Serrano MD, have performed the following reviews on [address] prior to the start of the procedure:            * Patient was identified by name and date of birth   * Agreement on procedure being performed was verified  * Risks and Benefits explained to the patient  * Procedure site verified and marked as necessary  * Patient was positioned for comfort  * Consent was signed and verified     Time: 1:28 PM    Date of procedure: 5/30/2017    Procedure performed by:  Mk Mclean MD    Provider assisted by: (see medication administration)    How tolerated by patient: tolerated the procedure well with no complications    Comments: none      IMAGING:   CT of the left shoudler dated 5/25/17 was reviewed and read:   IMPRESSION:  1. Mild fissuring of supraspinatus, infraspinatus and subscapularis tendons. No measurable partial or full thickness rotator cuff tear. 2. Intact labrum. 3. Amorphous density around the [facility] joint presumably represents calcification within the synovium and/or joint capsule.  This, along with a slightly bulbous distal clavicle, results in mild subacromial stenosis, considered to represent a mild risk factor for extrinsic shoulder impingement syndrome. 4. Based on the pattern of contrast accumulation with the biceps sheath, there may be adhesions around the long head biceps tendon, bu thte tendon itself appears intact. IMPRESSION:      ICD-10-CM ICD-9-CM    1. Biceps tendinopathy, left M67.922 727.9 TRIAMCINOLONE ACETONIDE INJ      triamcinolone acetonide (KENALOG) 40 mg/mL injection      DRAIN/INJECT LARGE JOINT/BURSA   2. Adhesive capsulitis of left shoulder M75.02 726.0 REFERRAL TO PHYSICAL THERAPY        PLAN: I discussed the results of the CT and the treatment options with the patient. With the patient's permission, I injected the left biceps tendon with cortisone today. She will also begin a short course of physical therapy. I will see her back in one month for reevaluation. Follow-up Disposition: Not on File    Scribed by Compa Dodd [facility]) as dictated by Mayi Wilson MD    I, Dr. Mayi Wilson, confirm that all documentation is accurate.     Mayi Wilson M.D.   [facility]

## 2017-06-28 ENCOUNTER — OFFICE VISIT (OUTPATIENT)
Dept: ORTHOPEDIC SURGERY | Age: 77
End: 2017-06-28

## 2017-06-28 VITALS
BODY MASS INDEX: 35.14 KG/M2 | SYSTOLIC BLOOD PRESSURE: 120 MMHG | DIASTOLIC BLOOD PRESSURE: 71 MMHG | HEART RATE: 68 BPM | WEIGHT: 179 LBS | HEIGHT: 60 IN

## 2017-06-28 DIAGNOSIS — M54.16 RADICULOPATHY, LUMBAR REGION: ICD-10-CM

## 2017-06-28 DIAGNOSIS — M48.061 SPINAL STENOSIS, LUMBAR: ICD-10-CM

## 2017-06-28 DIAGNOSIS — M51.36 OTHER INTERVERTEBRAL DISC DEGENERATION, LUMBAR REGION: ICD-10-CM

## 2017-06-28 DIAGNOSIS — M47.816 SPONDYLOSIS OF LUMBAR REGION WITHOUT MYELOPATHY OR RADICULOPATHY: Primary | ICD-10-CM

## 2017-06-28 RX ORDER — DULOXETIN HYDROCHLORIDE 60 MG/1
60 CAPSULE, DELAYED RELEASE ORAL DAILY
Qty: 30 CAP | Refills: 1 | Status: SHIPPED | OUTPATIENT
Start: 2017-06-28

## 2017-06-28 NOTE — MR AVS SNAPSHOT
Visit Information Date & Time Provider Department Dept. Phone Encounter #  
 6/28/2017  2:55 PM Vee Spaulding MD [facility] - [facility] [id number] Follow-up Instructions Return in about 4 weeks (around 7/26/2017). Your Appointments 7/11/2017  1:20 PM  
Follow Up with Shena Conner MD  
[facility] ([address]) Appt Note: lt shoulder fu after physical therapy 2012 [facility] [address]  
[phone]  
  
   
 2012 [address] [facility] Upcoming Health Maintenance Date Due DTaP/Tdap/Td series (1 - Tdap) 6/11/1963 FOBT Q 1 YEAR AGE 50-75 6/11/1992 ZOSTER VACCINE AGE 60> 6/11/2002 GLAUCOMA SCREENING Q2Y 6/11/2007 OSTEOPOROSIS SCREENING (DEXA) 6/11/2007 Pneumococcal 65+ Low/Medium Risk (1 of 2 - PCV13) 6/11/2007 MEDICARE YEARLY EXAM 6/11/2007 INFLUENZA AGE 9 TO ADULT 8/1/2017 Allergies as of 6/28/2017  Review Complete On: 6/28/2017 By: Vee Spaulding MD  
  
 Severity Noted Reaction Type Reactions Codeine  08/16/2016    Nausea Only Phenobarbital  08/16/2016    Hives Tetanus And Diphtheria Toxoids  08/16/2016    Swelling Vicodin [Hydrocodone-acetaminophen]  08/16/2016    Nausea Only Current Immunizations  Never Reviewed No immunizations on file. Not reviewed this visit You Were Diagnosed With   
  
 Codes Comments Spondylosis of lumbar region without myelopathy or radiculopathy    -  Primary ICD-10-CM: M47.816 ICD-9-CM: 721.3 Other intervertebral disc degeneration, lumbar region     ICD-10-CM: M51.36 
ICD-9-CM: 722.52 Radiculopathy, lumbar region     ICD-10-CM: M54.16 
ICD-9-CM: 724.4 Spinal stenosis, lumbar     ICD-10-CM: M48.06 
ICD-9-CM: 724.02 Vitals BP Pulse Height(growth percentile) Weight(growth percentile) BMI OB Status 120/71 68 5' (1.524 m) 179 lb (81.2 kg) 34.96 kg/m2 Menopause Smoking Status Former Smoker Vitals History BMI and BSA Data Body Mass Index Body Surface Area 34.96 kg/m 2 1.85 m 2 Preferred Pharmacy Pharmacy Name Phone [facility] - [address] [facility] [address] [phone] Your Updated Medication List  
  
   
This list is accurate as of: 6/28/17  4:03 PM.  Always use your most recent med list. amLODIPine 5 mg tablet Commonly known as:  NORVASC  
  
 aspirin delayed-release 81 mg tablet Take  by mouth daily. atorvastatin 10 mg tablet Commonly known as:  LIPITOR Take  by mouth daily. baclofen 10 mg tablet Commonly known as:  LIORESAL Take  by mouth three (3) times daily. BREO ELLIPTA IN Take  by inhalation. * DULoxetine 30 mg capsule Commonly known as:  CYMBALTA Take 1 Cap by mouth daily. * DULoxetine 30 mg capsule Commonly known as:  CYMBALTA Take 1 Cap by mouth daily. * DULoxetine 30 mg capsule Commonly known as:  CYMBALTA Take 1 Cap by mouth daily. * DULoxetine 60 mg capsule Commonly known as:  CYMBALTA Take 1 Cap by mouth daily. furosemide 40 mg tablet Commonly known as:  LASIX Take  by mouth daily. INCRUSE ELLIPTA 62.5 mcg/actuation inhaler Generic drug:  umeclidinium Take 1 Puff by inhalation daily. losartan 100 mg tablet Commonly known as:  COZAAR Take 100 mg by mouth daily. meloxicam 15 mg tablet Commonly known as:  MOBIC Take 15 mg by mouth daily. metoprolol tartrate 25 mg tablet Commonly known as:  LOPRESSOR Take  by mouth two (2) times a day. omeprazole 20 mg capsule Commonly known as:  PRILOSEC Take 20 mg by mouth daily. oxyCODONE-acetaminophen  mg per tablet Commonly known as:  PERCOCET  
 Take 1 Tab by mouth every six (6) hours as needed for Pain. Max Daily Amount: 4 Tabs. rOPINIRole 2 mg tablet Commonly known as:  Kriste Ruse Take  by mouth nightly. rosuvastatin 5 mg tablet Commonly known as:  CRESTOR  
  
 spironolactone 25 mg tablet Commonly known as:  ALDACTONE Take  by mouth daily. traMADol 50 mg tablet Commonly known as:  ULTRAM  
Take 1 Tab by mouth every six (6) hours as needed for Pain. Max Daily Amount: 200 mg. VENTOLIN HFA 90 mcg/actuation inhaler Generic drug:  albuterol Take  by inhalation. * Notice: This list has 4 medication(s) that are the same as other medications prescribed for you. Read the directions carefully, and ask your doctor or other care provider to review them with you. Prescriptions Sent to Pharmacy Refills DULoxetine (CYMBALTA) 60 mg capsule 1 Sig: Take 1 Cap by mouth daily. Class: Normal  
 Pharmacy: [address] [place] [address] #: [phone] Route: Oral  
  
Follow-up Instructions Return in about 4 weeks (around 7/26/2017). Introducing [facility] & HEALTH SERVICES! Ed Aguilera introduces [employer] patient portal. Now you can access parts of your medical record, email your doctor's office, and request medication refills online. 1. In your internet browser, go to [email or web address] 2. Click on the First Time User? Click Here link in the Sign In box. You will see the New Member Sign Up page. 3. Enter your [employer] Access Code exactly as it appears below. You will not need to use this code after youve completed the sign-up process. If you do not sign up before the expiration date, you must request a new code. · [employer] Access Code: [email or web address] Expires: 8/14/2017  2:35 PM 
 
4. Enter the last four digits of your Social Security Number (xxxx) and Date of Birth (mm/dd/yyyy) as indicated and click Submit.  You will be taken to the next sign-up page. 5. Create a [employer] ID. This will be your [employer] login ID and cannot be changed, so think of one that is secure and easy to remember. 6. Create a [employer] password. You can change your password at any time. 7. Enter your Password Reset Question and Answer. This can be used at a later time if you forget your password. 8. Enter your e-mail address. You will receive e-mail notification when new information is available in [address] 9. Click Sign Up. You can now view and download portions of your medical record. 10. Click the Download Summary menu link to download a portable copy of your medical information. If you have questions, please visit the Frequently Asked Questions section of the [employer] website. Remember, [employer] is NOT to be used for urgent needs. For medical emergencies, dial 911. Now available from your iPhone and Android! Please provide this summary of care documentation to your next provider. Your primary care clinician is listed as Anca Rodriguez. If you have any questions after today's visit, please call [phone].

## 2017-06-28 NOTE — PROGRESS NOTES
[facility]  [address]  [address], [place]   Phone: [phone]  Fax: [phone]        PROGRESS NOTE      HISTORY OF PRESENT ILLNESS:  The patient is a 78 y.o. female and was seen today for follow up of chronic bilateral buttocks extending posteriorly into BLE (R>L) worse since March 2016. She states pain is exacerbated with ambulation and describes symptoms consistent for stenosis. Note from Dr. Melva Landau dated 9/6/16 indicating patient was seen with c/o bilateral knee pain and described pain radiating from buttocks into bilateral knees. Pt reports a ground-level fall on 3/19/17 in her kitchen and describes generalized weakness including her upper extremities (L>R), resulting in a syncopal episode. Pt believes she had TIA. She denies difficulty with speech. Since her fall, she reports onset of LUE pain which is exacerbated with overhead activity. Pt continues to have left shoulder pain but states it can extend below the elbow intermittently to the hand. Note from Dr. Melva Landau dated 5/16/17 indicating patient was seen for evaluation of left shoulder pain. Of note, there was slight proximal migration of the humeral head by x-ray. At that time, a left shoulder injection was administered and a CT scan of the left shoulder was ordered. Pt reports no relief with left shoulder injection. Pt was hospitalized for COPD, CHF, bronchitis and UTI. Pt states she had an allergic reaction to PERCOCET. Pt reports discontinuing NEURONTIN 300 mg TID as she believes her pain increased with medication. Her pain subsided after d/c medication. She completed Medrol Dosepak with slight benefit. Pt is tolerating increased dose of Cymbalta 30 mg per day with improvement in symptoms. She denies hx of lumbar spinal surgery or blocks. She has not attended physical therapy/chiropractor. She denies hx of glaucoma, DM, stomach ulcers, or bleeding disorders. Pt denies change in bowel or bladder habits. Pt denies fever, weight loss, or skin changes. She is not a candidate for an MRI due to clip in her brain from surgery in 1998. Pt is LHD. Preliminary reading of lumbar plain films revealed dextrorotary scoliosis, apex L2 convex to the right. Grade 1 retrolisthesis of L3 on L4. Panlumbar degenerative disc disease. Anterior osteophytes at all levels of lumbar spine. No acute pathology identified. Lumbar spine CT dated 10/5/16 reviewed. Per report, no acute osseous finding. Interval progression of scoliotic curvature and asymmetric left-sided degenerative spondylosis L2,-L4 and right-sided degenerative spondylosis L4/5. Severe canal stenosis L4/5, moderate canal stenosis L3/4, mild to moderate canal stenosis L2/3. Associated moderate right lateral recess stenosis L4/5, mild to moderate foraminal stenosis at these levels. Persistent severe asymmetric right-sided degenerative spondylosis L5/S1 with severe right foraminal stenosis. At her last clinical appointment, she continued to demonstrate mechanical left shoulder pain. Patient also reported her left shoulder pain could intermittently extend distally to the hand. Some of her symptoms possibly may be coming from cervical spinal pathology. We awaited the results of her left shoulder CT prior to proceeding with workup to evaluate for cervical spinal pathology. Her lumbar radiculopathy was well-controlled on Cymbalta 30 mg. She was provided with refills of her Cymbalta 30 mg today. The patient returns today with increased lumbar and bilateral hip pain. Pt denies left shoulder or radicular LUE pain at this time. She rates pain 0-10/10, elevated since her last visit (0/10). Her pain is exacerbated with prolonged standing and flexion activity. Notes from Dr. Kalyan Jsoe dated 5/20/17 reviewed. Pt underwent left biceps tendon injection at that time with significant relief. She is compliant with Cymbalta 30 mg per day.  reviewed.        Past Medical History:   Diagnosis Date    Brain aneurysm     CHF (congestive heart failure) (HCC)     Chronic obstructive pulmonary disease (HCC)     Hypertension         Social History     Social History    Marital status:      Spouse name: N/A    Number of children: N/A    Years of education: N/A     Occupational History    Not on file. Social History Main Topics    Smoking status: Former Smoker    Smokeless tobacco: Never Used    Alcohol use No    Drug use: No    Sexual activity: Not on file     Other Topics Concern    Not on file     Social History Narrative       Current Outpatient Prescriptions   Medication Sig Dispense Refill    DULoxetine (CYMBALTA) 60 mg capsule Take 1 Cap by mouth daily. 30 Cap 1    DULoxetine (CYMBALTA) 30 mg capsule Take 1 Cap by mouth daily. 90 Cap 0    rosuvastatin (CRESTOR) 5 mg tablet       amLODIPine (NORVASC) 5 mg tablet       umeclidinium (INCRUSE ELLIPTA) 62.5 mcg/actuation inhaler Take 1 Puff by inhalation daily.  FLUTICASONE/VILANTEROL (BREO ELLIPTA IN) Take  by inhalation.  albuterol (VENTOLIN HFA) 90 mcg/actuation inhaler Take  by inhalation.  rOPINIRole (REQUIP) 2 mg tablet Take  by mouth nightly.  omeprazole (PRILOSEC) 20 mg capsule Take 20 mg by mouth daily.  baclofen (LIORESAL) 10 mg tablet Take  by mouth three (3) times daily.  metoprolol tartrate (LOPRESSOR) 25 mg tablet Take  by mouth two (2) times a day.  atorvastatin (LIPITOR) 10 mg tablet Take  by mouth daily.  furosemide (LASIX) 40 mg tablet Take  by mouth daily.  aspirin delayed-release 81 mg tablet Take  by mouth daily.  spironolactone (ALDACTONE) 25 mg tablet Take  by mouth daily.  losartan (COZAAR) 100 mg tablet Take 100 mg by mouth daily.  meloxicam (MOBIC) 15 mg tablet Take 15 mg by mouth daily.  DULoxetine (CYMBALTA) 30 mg capsule Take 1 Cap by mouth daily. 90 Cap 0    DULoxetine (CYMBALTA) 30 mg capsule Take 1 Cap by mouth daily.  90 Cap 0    traMADol (ULTRAM) 50 mg tablet Take 1 Tab by mouth every six (6) hours as needed for Pain. Max Daily Amount: 200 mg. 40 Tab 0    oxyCODONE-acetaminophen (PERCOCET)  mg per tablet Take 1 Tab by mouth every six (6) hours as needed for Pain. Max Daily Amount: 4 Tabs. 40 Tab 0       Allergies   Allergen Reactions    Codeine Nausea Only    Phenobarbital Hives    Tetanus And Diphtheria Toxoids Swelling    Vicodin [Hydrocodone-Acetaminophen] Nausea Only          PHYSICAL EXAMINATION    Visit Vitals    /71    Pulse 68    Ht 5' (1.524 m)    Wt 179 lb (81.2 kg)    BMI 34.96 kg/m2       CONSTITUTIONAL: NAD, A&O x 3  SENSATION: Intact to light touch throughout  RANGE OF MOTION: The patient has full passive range of motion in all four extremities. MOTOR:  Straight Leg Raise: Negative, bilateral               Hip Flex Knee Ext Knee Flex Ankle DF GTE Ankle PF Tone   Right +4/5 +4/5 +4/5 +4/5 +4/5 +4/5 +4/5   Left +4/5 +4/5 +4/5 +4/5 +4/5 +4/5 +4/5       ASSESSMENT   Hilary Smith was seen today for follow-up. Diagnoses and all orders for this visit:    Spondylosis of lumbar region without myelopathy or radiculopathy    Other intervertebral disc degeneration, lumbar region    Radiculopathy, lumbar region    Spinal stenosis, lumbar    Other orders  -     DULoxetine (CYMBALTA) 60 mg capsule; Take 1 Cap by mouth daily. IMPRESSION AND PLAN:  The patient presents with increase lumbar and bilateral hip pain. Her left shoulder and radicular type pain of the LUE has resolved. I will increase her Cymbalta to 60 mg per day. Patient advised to call the office if intolerant to higher dose. I will see the patient back in 1 month's time or earlier if needed. Written by Arthea , as dictated by Lamont Carvalho MD  I examined the patient, reviewed and agree with the note.

## 2017-07-11 ENCOUNTER — OFFICE VISIT (OUTPATIENT)
Dept: ORTHOPEDIC SURGERY | Age: 77
End: 2017-07-11

## 2017-07-11 VITALS
SYSTOLIC BLOOD PRESSURE: 135 MMHG | RESPIRATION RATE: 15 BRPM | HEIGHT: 60 IN | WEIGHT: 180.6 LBS | BODY MASS INDEX: 35.46 KG/M2 | DIASTOLIC BLOOD PRESSURE: 87 MMHG | HEART RATE: 73 BPM

## 2017-07-11 DIAGNOSIS — M75.02 ADHESIVE CAPSULITIS OF LEFT SHOULDER: ICD-10-CM

## 2017-07-11 DIAGNOSIS — M67.922 BICEPS TENDINOPATHY, LEFT: Primary | ICD-10-CM

## 2017-07-11 NOTE — PATIENT INSTRUCTIONS
Frozen Shoulder: Care Instructions  Your Care Instructions    Frozen shoulder is stiffness, pain, and trouble moving your shoulder. It may happen after an injury or overuse, or from a disease such as diabetes or a stroke. You may have pain that keeps you from using your shoulder. However, you need to move your shoulder. If you do not move it, it will get more stiff and sore. Your doctor may order an X-ray to make sure there is not another cause for your stiff shoulder. You can treat frozen shoulder with heat, stretching, over-the-counter pain medicines, and physical therapy. Your doctor also may inject medicine into your shoulder to reduce pain and swelling. It can take a year or more to get better. Surgery is almost never needed. Follow-up care is a key part of your treatment and safety. Be sure to make and go to all appointments, and call your doctor if you are having problems. It's also a good idea to know your test results and keep a list of the medicines you take. How can you care for yourself at home? · Take pain medicines exactly as directed. ¨ If the doctor gave you a prescription medicine for pain, take it as prescribed. ¨ If you are not taking a prescription pain medicine, ask your doctor if you can take an over-the-counter medicine. · Put a heating pad set on low or a warm, wet towel wrapped in plastic on your shoulder. The heat may make it easier to stretch your shoulder. · Follow your doctor's advice for stretches and exercises. · Go to physical therapy if your doctor suggests it. · Try these stretching exercises to reduce stiffness if your doctor says it is okay. Do the exercises slowly to avoid injury. Put a warm, wet towel on your shoulder before exercising. Stop any exercise that increases pain. ¨ Pendulum exercise. While leaning forward and holding onto a table or the back of a chair with your good arm, bend at the waist, allowing your affected arm to hang straight down.  Swing the affected arm back and forth like a pendulum, then in circles that start small and gradually grow larger as pain allows. Try this for about 2 or 3 minutes, several times a day. Once pain begins to go away, you can do this exercise while holding a 1- or 2-pound weight. ¨ Wall climbing (to the side). Stand with your side to a wall so that your fingers can just touch it. Then turn so your body is turned slightly toward the wall. Walk the fingers of your injured arm up the wall as high as pain permits. Hold that position for a count of 15 to 30 seconds. Walk your fingers down to the starting position. Repeat 2 to 4 times, trying to reach higher each time. ¨ Wall climbing (to the front). Face a wall, standing so your fingers can just touch it. Walk the fingers of your affected arm up the wall as high as pain permits. Hold that position for a count of 15 to 30 seconds. Slowly walk your fingers to the starting position. Repeat 2 to 4 times, trying to reach higher each time. When should you call for help? Call your doctor now or seek immediate medical care if:  · You have severe pain. · Your arm is cool or pale or changes color. · You have tingling or numbness in your arm. Watch closely for changes in your health, and be sure to contact your doctor if:  · You have increased pain. · You have new pain that develops in another area. For example, you have pain in your arm, hand, or elbow. · You do not get better as expected. Where can you learn more? Go to http://christie-shae.info/. Enter M503 in the search box to learn more about \"Frozen Shoulder: Care Instructions. \"  Current as of: March 21, 2017  Content Version: 11.3  © 7654-2191 [employer]. Care instructions adapted under license by [employer] (which disclaims liability or warranty for this information).  If you have questions about a medical condition or this instruction, always ask your healthcare professional. [employer] disclaims any warranty or liability for your use of this information.

## 2017-07-11 NOTE — PROGRESS NOTES
[facility]elsy [facility]  1940   Chief Complaint   Patient presents with    Shoulder Pain     left shoulder        HISTORY OF PRESENT ILLNESS  Stephanie Elder is a 78 y.o. female who presents today for reevaluation of left shoulder pain. At last office visit, I reviewed the results fo the CT scan, injected the left biceps tendon and patient was started on short course of PT. She rates her pain 0/10 today. She attended one session of PT and has been doing HEP since. She reports having good pain relief from the cortisone injection. She notes the pain has been present about 5-6 months but does not recall a particular injury. She has congestive heart failure. She is unable to have an MRI due to a clip in her brain. Patient denies any fever, chills, chest pain, shortness of breath or calf pain. There are no new illness or injuries to report since last seen in the office. There are no changes to medications, allergies, family or social history. PHYSICAL EXAM:   Visit Vitals    /87    Pulse 73    Resp 15    Ht 5' (1.524 m)    Wt 180 lb 9.6 oz (81.9 kg)    BMI 35.27 kg/m2     The patient is a well-developed, well-nourished female   in no acute distress. The patient is alert and oriented times three. The patient is alert and oriented times three. Mood and affect are normal.  LYMPHATIC: lymph nodes are not enlarged and are within normal limits  SKIN: normal in color and non tender to palpation. There are no bruises or abrasions noted. NEUROLOGICAL: Motor sensory exam is within normal limits. Reflexes are equal bilaterally.  There is normal sensation to pinprick and light touch  MUSCULOSKELETAL:  Examination Left shoulder   Skin Intact   AC joint tenderness -   Biceps tenderness -   Forward flexion/Elevation    Active abduction    Glenohumeral abduction 80   External rotation ROM 45   Internal rotation ROM 30   Apprehension -   Lindas Relocation -   Jerk -   Load and Shift -   Orbie Smiles - Speeds -   Impingement sign +   Supraspinatus/Empty Can -, 5/5   External Rotation Strength -, 5/5   Lift Off/Belly Press -, 5/5   Neurovascular Intact        PROCEDURE: After sterile prep, 0.5 cc of Xylocaine and 0.5 cc of Kenalog were injected into the left biceps tendon. [facility] - [facility]  OFFICE PROCEDURE PROGRESS NOTE        Chart reviewed for the following:  Reshma Buckley MD, have reviewed the History, Physical and updated the Allergic reactions for [address] performed immediately prior to start of procedure:  Reshma Buckley MD, have performed the following reviews on [address] prior to the start of the procedure:            * Patient was identified by name and date of birth   * Agreement on procedure being performed was verified  * Risks and Benefits explained to the patient  * Procedure site verified and marked as necessary  * Patient was positioned for comfort  * Consent was signed and verified     Time: 1:28 PM    Date of procedure: 7/11/2017    Procedure performed by:  Lyndon Gray MD    Provider assisted by: (see medication administration)    How tolerated by patient: tolerated the procedure well with no complications    Comments: none      IMAGING:   CT of the left shoudler dated 5/25/17 was reviewed and read:   IMPRESSION:  1. Mild fissuring of supraspinatus, infraspinatus and subscapularis tendons. No measurable partial or full thickness rotator cuff tear. 2. Intact labrum. 3. Amorphous density around the [facility] joint presumably represents calcification within the synovium and/or joint capsule. This, along with a slightly bulbous distal clavicle, results in mild subacromial stenosis, considered to represent a mild risk factor for extrinsic shoulder impingement syndrome.   4. Based on the pattern of contrast accumulation with the biceps sheath, there may be adhesions around the long head biceps tendon, bu thte tendon itself appears intact. IMPRESSION:      ICD-10-CM ICD-9-CM    1. Biceps tendinopathy, left M67.922 727.9    2. Adhesive capsulitis of left shoulder M75.02 726.0         PLAN:   1. Patient has seen good improvement in her left shoulder symptoms. Patient responded well to the cortisone injection she previously received. She is pleased with her progress at this time and will continue her activities as tolerated. 2. No cortisone injection indicated today   3. No Physical/Occupational Therapy indicated today  4. No diagnostic test indicated today  5. No durable medical equipment indicated today  6. No referral indicated today   7. No medications indicated today  8. No Narcotic indicated today. RTC PRN     Follow-up Disposition: Not on File    Scribed by Michelle Tobias [address]) as dictated by Fabienne Law MD    I, Dr. Fabienne Law, confirm that all documentation is accurate.     Fabienne Law M.D.   [facility]

## 2017-07-19 ENCOUNTER — OFFICE VISIT (OUTPATIENT)
Dept: ORTHOPEDIC SURGERY | Age: 77
End: 2017-07-19

## 2017-07-19 VITALS — HEIGHT: 60 IN | WEIGHT: 177 LBS | TEMPERATURE: 97 F | BODY MASS INDEX: 34.75 KG/M2

## 2017-07-19 DIAGNOSIS — M79.601 RIGHT ARM PAIN: ICD-10-CM

## 2017-07-19 DIAGNOSIS — S42.291A OTHER CLOSED DISPLACED FRACTURE OF PROXIMAL END OF RIGHT HUMERUS, INITIAL ENCOUNTER: Primary | ICD-10-CM

## 2017-07-19 NOTE — PROGRESS NOTES
I have individually seen and examined Ming Coreas in addition to the physician assistant. Patient was seen about one week ago for a cortisone injection in the left shoulder. The same day, she fell and sustained a right proximal humerus fracture. She reports to the office in a sling today. She rates her pain 10/10 today. Assessment:    ICD-10-CM ICD-9-CM    1. Other closed displaced fracture of proximal end of right humerus, initial encounter S42.291A 812.09 REFERRAL TO PHYSICAL THERAPY   2. Right arm pain M79.601 729.5 AMB POC XRAY; HUMERUS, 2+ VIEWS        Plan:  1. Patient does have a displaced, head splitting proximal humerus fracture. I discussed with the patient that fixing this surgically would require a shoulder replacement. Due to her other medical problems, I do not feel that this is a good option for her. We are going to give this time to heal without surgical intervention  2. No cortisone injection indicated today   3. No Physical/Occupational Therapy indicated today  4. No diagnostic test indicated today  5. No durable medical equipment indicated today  6. No referral indicated today   7. No medications indicated today  8. No Narcotic indicated today.      RTC - 2 week for additional XRs      Scribed by Geovanna Vasques [address]) as dictated by Panfilo Loera MD     I, Dr. Panfilo Loera, confirm that all documentation is accurate.     Panfilo Loera M.D.   [facility]

## 2017-07-19 NOTE — PROGRESS NOTES
Blanca [facility]  1940   No chief complaint on file. HISTORY OF PRESENT ILLNESS  Ila Sandy is a 78 y.o. female who presents today for evaluation of right shoulder pain. She states that right after she saw us for her left shoulder she fell on 7/12/17 at home. She was taken via EMS to ER where Dr. Juan Pablo Espana was consulted and recommended follow up in office. She states her pain is a 10/10. she is now in a nursing home and notes that her pain is quite severe. She is in a sling and notes significant swelling. Patient denies any fever, chills, chest pain, shortness of breath or calf pain. There are no new illness or injuries to report since last seen in the office. No changes in medications, allergies, social or family history. PHYSICAL EXAM:   Visit Vitals    Temp 97 °F (36.1 °C) (Oral)    Ht 5' (1.524 m)    Wt 177 lb (80.3 kg)  Comment: patient states    BMI 34.57 kg/m2     The patient is a well-developed, well-nourished female   in no acute distress. The patient is alert and oriented times three. The patient is alert and oriented times three. Mood and affect are normal.  LYMPHATIC: lymph nodes are not enlarged and are within normal limits  SKIN: normal in color and non tender to palpation. There are no bruises or abrasions noted. NEUROLOGICAL: Motor sensory exam is within normal limits. Reflexes are equal bilaterally.  There is normal sensation to pinprick and light touch  MUSCULOSKELETAL:  Examination Right shoulder   Skin Intact   AC joint tenderness -   Biceps tenderness -   Forward flexion/Elevation ROM Unable to assess secondary to fx   Active abduction ROM Unable to assess secondary to fx   Glenohumeral abduction Unable to assess secondary to fx   External rotation ROM Unable to assess secondary to fx   Internal rotation ROM Unable to assess secondary to fx   Apprehension -   Lindas Relocation -   Jerk -   Load and Shift -   [place] -   Speeds -   Impingement sign - Supraspinatus/Empty Can Unable to assess secondary to fx   External Rotation Strength Unable to assess secondary to fx   Lift Off/Belly Press Unable to assess secondary to fx   Neurovascular Intact      diffuse swelling and ecchymosis noted to [place]. Unable to make a full fist.     IMAGIN view xray images of right humerus read and reviewed by myself reveal displaced proximal humerus fracture appears to be head splitting fracture     IMPRESSION:      ICD-10-CM ICD-9-CM    1. Other closed displaced fracture of proximal end of right humerus, initial encounter S42.291A 812.09 REFERRAL TO PHYSICAL THERAPY   2. Right arm pain M79.601 729.5 AMB POC XRAY; HUMERUS, 2+ VIEWS        PLAN:  1. Head splitting proximal humerus fracture. Discussed with patient that this would ideally be treated with a total shoulder arthroplasty. Given her h/o COPD and cardiac issues would likely be best handled conservatively at this time. 2. No cortisone injection indicated today   3. Yes Physical/Occupational Therapy indicated today: she is currently at nursing home. Ordered ice to hand throughout day, arom of elbow, wrist and fingers to help decrease swelling. No motion of shoulder for now. NWB right upper extremity  4. No diagnostic test indicated today:   5. No durable medical equipment indicated today  6. No referral indicated today   7. Cont percocet for pain control.      RTC 2 weeks for repeat xrays    Follow-up Disposition: Not on File    Patient seen and evaluated by Dr. China Nova today who agrees with treatment plan     [facility]

## 2017-08-02 ENCOUNTER — OFFICE VISIT (OUTPATIENT)
Dept: ORTHOPEDIC SURGERY | Age: 77
End: 2017-08-02

## 2017-08-02 VITALS
HEART RATE: 61 BPM | HEIGHT: 60 IN | OXYGEN SATURATION: 91 % | DIASTOLIC BLOOD PRESSURE: 69 MMHG | TEMPERATURE: 97.5 F | SYSTOLIC BLOOD PRESSURE: 115 MMHG

## 2017-08-02 DIAGNOSIS — S42.291D OTHER CLOSED DISPLACED FRACTURE OF PROXIMAL END OF RIGHT HUMERUS WITH ROUTINE HEALING, SUBSEQUENT ENCOUNTER: Primary | ICD-10-CM

## 2017-08-02 NOTE — PROGRESS NOTES
Gee Potts [facility]  1940   Chief Complaint   Patient presents with    Shoulder Pain     right        HISTORY OF PRESENT ILLNESS  Joe Radford is a 78 y.o. female who presents today for evaluation of right shoulder pain after she fell on 7/12/17 at home. She states her pain is a 8/10. she is now in a nursing home and notes that her pain is improved. She is in a sling and states her swelling is better. She feels like it must be healing as she is more comfortable. Patient denies any fever, chills, chest pain, shortness of breath or calf pain. There are no new illness or injuries to report since last seen in the office. No changes in medications, allergies, social or family history. PHYSICAL EXAM:   Visit Vitals    /69    Pulse 61    Temp 97.5 °F (36.4 °C)    Ht 5' (1.524 m)    SpO2 91%     The patient is a well-developed, well-nourished female   in no acute distress. The patient is alert and oriented times three. The patient is alert and oriented times three. Mood and affect are normal.  LYMPHATIC: lymph nodes are not enlarged and are within normal limits  SKIN: normal in color and non tender to palpation. There are no bruises or abrasions noted. NEUROLOGICAL: Motor sensory exam is within normal limits. Reflexes are equal bilaterally.  There is normal sensation to pinprick and light touch  MUSCULOSKELETAL:  Examination Right shoulder   Skin Intact   AC joint tenderness -   Biceps tenderness -   Forward flexion/Elevation ROM Unable to assess secondary to fx   Active abduction ROM Unable to assess secondary to fx   Glenohumeral abduction Unable to assess secondary to fx   External rotation ROM Unable to assess secondary to fx   Internal rotation ROM Unable to assess secondary to fx   Apprehension -   Lindas Relocation -   Jerk -   Load and Shift -   Obriens -   Speeds -   Impingement sign -   Supraspinatus/Empty Can Unable to assess secondary to fx   External Rotation Strength Unable to assess secondary to fx   Lift Off/Belly Press Unable to assess secondary to fx   Neurovascular Intact     Improvement in swelling in hand, able to make a full fist    IMAGIN view xray images of right humerus read and reviewed by myself reveal displaced proximal humerus fracture appears to be head splitting fracture no change in position. Some callus seen    IMPRESSION:      ICD-10-CM ICD-9-CM    1. Other closed displaced fracture of proximal end of right humerus with routine healing, subsequent encounter S42.291D V54.11 AMB POC XRAY; HUMERUS, 2+ VIEWS        PLAN:  1. Head splitting proximal humerus fracture. Discussed with patient that this would ideally be treated with a total shoulder arthroplasty. Given her h/o COPD and cardiac issues would likely be best handled conservatively at this time. home to apply ice to the hand during the day, work on active ROM of the elbow, wrist and fingers. She will remain on the limitations of no lifting with the RUE. Continue to wear the sling. Risk factors include: hypertension  2. No cortisone injection indicated today   3. Physical/Occupational Therapy indicated today: as listed above  4. No diagnostic test indicated today:   5. No durable medical equipment indicated today  6. No referral indicated today   7. No medications indicated today  8.  No Narcotic indicated today.      RTC 3 weeks for repeat xrays    Follow-up Disposition: Not on File    Patient seen and evaluated by Dr. Adam Argueta today who agrees with treatment plan     [facility]ist

## 2017-08-02 NOTE — PROGRESS NOTES
I have individually seen and examined Kourtney Gutierrez in addition to the physician assistant. She rates her pain 10/10 today. She has been placed in a nursing home. She states she is doing a little better but is still experiencing significant pain. She continues to wear her sling. Assessment:    ICD-10-CM ICD-9-CM    1. Other closed displaced fracture of proximal end of right humerus with routine healing, subsequent encounter S42.291D V54.11 AMB POC XRAY; HUMERUS, 2+ VIEWS        Plan:  1. Patient continuing to experiencing pain secondary to the proximal humerus fracture. Instructed to do pendulum exercises. Patient given order for the nursing home to apply ice to the hand during the day, work on active ROM of the elbow, wrist and fingers. She will remain on the limitations of no lifting with the RUE. Continue to wear the sling. Risk factors include: hypertension  2. No cortisone injection indicated today   3. No Physical/Occupational Therapy indicated today:   4. No diagnostic test indicated today:   5. No durable medical equipment indicated today  6. No referral indicated today   7. No medications indicated today  8. No Narcotic indicated today.        RTC 3 weeks      Scribed by Krzysztof Laird [facility]) as dictated by Allyn Frye MD     I, Dr. Allyn Frye, confirm that all documentation is accurate.     Allyn Frye M.D.   [facility]

## 2017-08-23 ENCOUNTER — OFFICE VISIT (OUTPATIENT)
Dept: ORTHOPEDIC SURGERY | Age: 77
End: 2017-08-23

## 2017-08-23 VITALS
DIASTOLIC BLOOD PRESSURE: 86 MMHG | RESPIRATION RATE: 19 BRPM | TEMPERATURE: 96.4 F | HEIGHT: 60 IN | WEIGHT: 173 LBS | HEART RATE: 70 BPM | BODY MASS INDEX: 33.96 KG/M2 | SYSTOLIC BLOOD PRESSURE: 104 MMHG | OXYGEN SATURATION: 97 %

## 2017-08-23 DIAGNOSIS — S42.291D OTHER CLOSED DISPLACED FRACTURE OF PROXIMAL END OF RIGHT HUMERUS WITH ROUTINE HEALING, SUBSEQUENT ENCOUNTER: Primary | ICD-10-CM

## 2017-08-23 DIAGNOSIS — M25.511 RIGHT SHOULDER PAIN, UNSPECIFIED CHRONICITY: ICD-10-CM

## 2017-08-23 NOTE — PROGRESS NOTES
Archie Patrick [facility]  1940   Chief Complaint   Patient presents with    Shoulder Pain     right        HISTORY OF PRESENT ILLNESS  Lisa Polanco is a 78 y.o. female who presents today for reevaluation of right shoulder pain. She rates her pain 8/10 today. Patient recalls falling 7/12/17 while at home. She is still wearing the sling. She has been attending PT. She states PT makes her pain worse. She is still experiencing significant pain with movement of the arm. She pain persists despite percocet. She is also c/o a weird sensation in the right fingers for about 1 week. Patient denies any fever, chills, chest pain, shortness of breath or calf pain. There are no new illness or injuries to report since last seen in the office. There are no changes to medications, allergies, family or social history. PHYSICAL EXAM:   Visit Vitals    /86    Pulse 70    Temp 96.4 °F (35.8 °C)    Resp 19    Ht 5' (1.524 m)    Wt 173 lb (78.5 kg)    SpO2 97%    BMI 33.79 kg/m2     The patient is a well-developed, well-nourished female   in no acute distress. The patient is alert and oriented times three. The patient is alert and oriented times three. Mood and affect are normal.  LYMPHATIC: lymph nodes are not enlarged and are within normal limits  SKIN: normal in color and non tender to palpation. There are no bruises or abrasions noted. NEUROLOGICAL: Motor sensory exam is within normal limits. Reflexes are equal bilaterally.  There is normal sensation to pinprick and light touch  MUSCULOSKELETAL:  Examination Right shoulder   Skin Intact   AC joint tenderness -   Biceps tenderness -   Forward flexion/Elevation ROM Unable to assess secondary to fx   Active abduction ROM Unable to assess secondary to fx   Glenohumeral abduction Unable to assess secondary to fx   External rotation ROM Unable to assess secondary to fx   Internal rotation ROM Unable to assess secondary to fx   Apprehension -   Lindas Relocation - Jerk -   Load and Shift -   Obriens -   Speeds -   Impingement sign -   Supraspinatus/Empty Can Unable to assess secondary to fx   External Rotation Strength Unable to assess secondary to fx   Lift Off/Belly Press Unable to assess secondary to fx   Neurovascular Intact      Improvement in swelling in hand, able to make a full fist     IMAGIN view xray from 17 of right humerus read and reviewed by myself reveal displaced proximal humerus fracture appears to be head splitting fracture no change in position. Some callus seen    IMPRESSION:      ICD-10-CM ICD-9-CM    1. Other closed displaced fracture of proximal end of right humerus with routine healing, subsequent encounter S42.291D V54.11    2. Right shoulder pain, unspecified chronicity M25.511 719.41 CANCELED: AMB POC XRAY, SHOULDER; COMPLETE, 2+        PLAN:   1. Since PT has not been helping we will discontinue PT. Gradually discontinue using the sling. XR is down today, we will take new XRs at next OV. Risk factors include: HTN  2. No cortisone injection indicated today   3. No Physical/Occupational Therapy indicated today  4. No diagnostic test indicated today  5. No durable medical equipment indicated today  6. No referral indicated today   7. No medications indicated today  8. No Narcotic indicated today    RTC 2 weeks additional XRs  Follow-up Disposition: Not on File    Scribed by Marion Delatorre [facility]) as dictated by Luiz Skinner MD    I, Dr. Luiz Skinner, confirm that all documentation is accurate.     Luiz Skinner M.D.   [facility]

## 2017-08-23 NOTE — PATIENT INSTRUCTIONS
Broken Arm: Care Instructions  Your Care Instructions  Fractures can range from a small, hairline crack, to a bone or bones broken into two or more pieces. Your treatment depends on how bad the break is. Your doctor may have put your arm in a splint or cast to allow it to heal or to keep it stable until you see another doctor. It may take weeks or months for your arm to heal. You can help your arm heal with some care at home. You heal best when you take good care of yourself. Eat a variety of healthy foods, and don't smoke. You may have had a sedative to help you relax. You may be unsteady after having sedation. It can take a few hours for the medicine's effects to wear off. Common side effects of sedation include nausea, vomiting, and feeling sleepy or tired. The doctor has checked you carefully, but problems can develop later. If you notice any problems or new symptoms, get medical treatment right away. Follow-up care is a key part of your treatment and safety. Be sure to make and go to all appointments, and call your doctor if you are having problems. It's also a good idea to know your test results and keep a list of the medicines you take. How can you care for yourself at home? · If the doctor gave you a sedative:  ¨ For 24 hours, don't do anything that requires attention to detail. It takes time for the medicine's effects to completely wear off. ¨ For your safety, do not drive or operate any machinery that could be dangerous. Wait until the medicine wears off and you can think clearly and react easily. · Put ice or a cold pack on your arm for 10 to 20 minutes at a time. Try to do this every 1 to 2 hours for the next 3 days (when you are awake). Put a thin cloth between the ice and your cast or splint. Keep the cast or splint dry. · Follow the cast care instructions your doctor gives you. If you have a splint, do not take it off unless your doctor tells you to. · Be safe with medicines.  Take pain medicines exactly as directed. ¨ If the doctor gave you a prescription medicine for pain, take it as prescribed. ¨ If you are not taking a prescription pain medicine, ask your doctor if you can take an over-the-counter medicine. · Prop up your arm on pillows when you sit or lie down in the first few days after the injury. Keep the arm higher than the level of your heart. This will help reduce swelling. · Follow instructions for exercises to keep your arm strong. · Wiggle your fingers and wrist often to reduce swelling and stiffness. When should you call for help? Call 911 anytime you think you may need emergency care. For example, call if:  · You have trouble breathing. · You passed out (lost consciousness). Call your doctor now or seek immediate medical care if:  · You have new or worse nausea or vomiting. · You have increased or severe pain. · Your hand is cool or pale or changes color. · You have tingling, weakness, or numbness in your hand or fingers. · Your cast or splint feels too tight. · You cannot move your fingers. · The skin under your cast or splint is burning or stinging. Watch closely for changes in your health, and be sure to contact your doctor if:  · You do not get better as expected. Where can you learn more? Go to http://christie-shae.info/. Enter L117 in the search box to learn more about \"Broken Arm: Care Instructions. \"  Current as of: March 21, 2017  Content Version: 11.3  © 0120-9834 [employer]. Care instructions adapted under license by [employer] (which disclaims liability or warranty for this information). If you have questions about a medical condition or this instruction, always ask your healthcare professional. [place] [address] any warranty or liability for your use of this information.

## 2017-08-25 ENCOUNTER — TELEPHONE (OUTPATIENT)
Dept: ORTHOPEDIC SURGERY | Age: 77
End: 2017-08-25

## 2017-08-25 DIAGNOSIS — S42.291D FRACTURE OF HUMERAL HEAD, CLOSED, RIGHT, WITH ROUTINE HEALING, SUBSEQUENT ENCOUNTER: Primary | ICD-10-CM

## 2017-08-25 NOTE — TELEPHONE ENCOUNTER
Massiel Serra called back and these instructions were given . She asked that an order be faxed and this was done.

## 2017-08-25 NOTE — TELEPHONE ENCOUNTER
[facility] is requesting clarification for patients weight bearing or range of motion restrictions - please advise Douglas Mcgrath at [phone] or by fax # [phone]

## 2017-08-28 ENCOUNTER — TELEPHONE (OUTPATIENT)
Dept: ORTHOPEDIC SURGERY | Age: 77
End: 2017-08-28

## 2017-08-28 NOTE — TELEPHONE ENCOUNTER
Nicollette w/ [facility] called back as they did not get the order ref weight bearing status on Fri. Please refax to Padmini at [phone]. She stated they were having problem with the other fax. Thank you.   Padmini can be reached at [phone]

## 2017-08-29 NOTE — TELEPHONE ENCOUNTER
Padmini called back with question about the information that was faxed over to her. Please contact Padmini at [phone].

## 2017-09-12 ENCOUNTER — OFFICE VISIT (OUTPATIENT)
Dept: ORTHOPEDIC SURGERY | Age: 77
End: 2017-09-12

## 2017-09-12 DIAGNOSIS — M25.511 RIGHT SHOULDER PAIN, UNSPECIFIED CHRONICITY: ICD-10-CM

## 2017-09-12 DIAGNOSIS — S42.291D FRACTURE OF HUMERAL HEAD, CLOSED, RIGHT, WITH ROUTINE HEALING, SUBSEQUENT ENCOUNTER: Primary | ICD-10-CM

## 2017-09-12 NOTE — PATIENT INSTRUCTIONS
Broken Arm: Care Instructions  Your Care Instructions  Fractures can range from a small, hairline crack, to a bone or bones broken into two or more pieces. Your treatment depends on how bad the break is. Your doctor may have put your arm in a splint or cast to allow it to heal or to keep it stable until you see another doctor. It may take weeks or months for your arm to heal. You can help your arm heal with some care at home. You heal best when you take good care of yourself. Eat a variety of healthy foods, and don't smoke. You may have had a sedative to help you relax. You may be unsteady after having sedation. It can take a few hours for the medicine's effects to wear off. Common side effects of sedation include nausea, vomiting, and feeling sleepy or tired. The doctor has checked you carefully, but problems can develop later. If you notice any problems or new symptoms, get medical treatment right away. Follow-up care is a key part of your treatment and safety. Be sure to make and go to all appointments, and call your doctor if you are having problems. It's also a good idea to know your test results and keep a list of the medicines you take. How can you care for yourself at home? · If the doctor gave you a sedative:  ¨ For 24 hours, don't do anything that requires attention to detail. It takes time for the medicine's effects to completely wear off. ¨ For your safety, do not drive or operate any machinery that could be dangerous. Wait until the medicine wears off and you can think clearly and react easily. · Put ice or a cold pack on your arm for 10 to 20 minutes at a time. Try to do this every 1 to 2 hours for the next 3 days (when you are awake). Put a thin cloth between the ice and your cast or splint. Keep the cast or splint dry. · Follow the cast care instructions your doctor gives you. If you have a splint, do not take it off unless your doctor tells you to. · Be safe with medicines.  Take pain medicines exactly as directed. ¨ If the doctor gave you a prescription medicine for pain, take it as prescribed. ¨ If you are not taking a prescription pain medicine, ask your doctor if you can take an over-the-counter medicine. · Prop up your arm on pillows when you sit or lie down in the first few days after the injury. Keep the arm higher than the level of your heart. This will help reduce swelling. · Follow instructions for exercises to keep your arm strong. · Wiggle your fingers and wrist often to reduce swelling and stiffness. When should you call for help? Call 911 anytime you think you may need emergency care. For example, call if:  · You have trouble breathing. · You passed out (lost consciousness). Call your doctor now or seek immediate medical care if:  · You have new or worse nausea or vomiting. · You have increased or severe pain. · Your hand is cool or pale or changes color. · You have tingling, weakness, or numbness in your hand or fingers. · Your cast or splint feels too tight. · You cannot move your fingers. · The skin under your cast or splint is burning or stinging. Watch closely for changes in your health, and be sure to contact your doctor if:  · You do not get better as expected. Where can you learn more? Go to http://christie-shae.info/. Enter H569 in the search box to learn more about \"Broken Arm: Care Instructions. \"  Current as of: March 21, 2017  Content Version: 11.3  © 0747-7931 [employer]. Care instructions adapted under license by [employer] (which disclaims liability or warranty for this information). If you have questions about a medical condition or this instruction, always ask your healthcare professional. [place] [address] any warranty or liability for your use of this information.

## 2017-09-12 NOTE — PROGRESS NOTES
Joanette Bloch [facility]  1940   No chief complaint on file. HISTORY OF PRESENT ILLNESS  Isis Green is a 78 y.o. female who presents today for reevaluation of right shoulder pain. At last OV, we discontinued PT since this was causing her more pain. She rates her pain 8/10 today. Patient recalls falling 7/12/17 while at home. She has continued to go to PT. She is still wearing the sling today. She says she only wears the sling as needed. She is still experiencing significant pain with movement of the arm. The pain persists despite percocet. She is also c/o a weird sensation in the right fingers for about 1 week. Patient denies any fever, chills, chest pain, shortness of breath or calf pain. There are no new illness or injuries to report since last seen in the office. There are no changes to medications, allergies, family or social history. PHYSICAL EXAM:   There were no vitals taken for this visit. The patient is a well-developed, well-nourished female   in no acute distress. The patient is alert and oriented times three. The patient is alert and oriented times three. Mood and affect are normal.  LYMPHATIC: lymph nodes are not enlarged and are within normal limits  SKIN: normal in color and non tender to palpation. There are no bruises or abrasions noted. NEUROLOGICAL: Motor sensory exam is within normal limits. Reflexes are equal bilaterally.  There is normal sensation to pinprick and light touch  MUSCULOSKELETAL:  Examination Right shoulder   Skin Intact   AC joint tenderness -   Biceps tenderness -   Forward flexion/Elevation ROM Unable to assess secondary to fx   Active abduction ROM Unable to assess secondary to fx   Glenohumeral abduction Unable to assess secondary to fx   External rotation ROM Unable to assess secondary to fx   Internal rotation ROM Unable to assess secondary to fx   Apprehension -   Lindas Relocation -   Jerk -   Load and Shift -   Obriens -   Speeds -   Impingement sign -   Supraspinatus/Empty Can Unable to assess secondary to fx   External Rotation Strength Unable to assess secondary to fx   Lift Off/Belly Press Unable to assess secondary to fx   Neurovascular Intact      Improvement in swelling in hand, able to make a full fist     IMAGING:   XR of the right shoulder dated 9/12/17 was reviewed and read: Proximal humerus fracture with collapse of fracture site but significant callus formation     2 view xray from 8/2/17 of right humerus read and reviewed by myself reveal displaced proximal humerus fracture appears to be head splitting fracture no change in position. Some callus seen    IMPRESSION:      ICD-10-CM ICD-9-CM    1. Fracture of humeral head, closed, right, with routine healing, subsequent encounter S42.291D V54.11    2. Right shoulder pain, unspecified chronicity M25.511 719.41 AMB POC XRAY, SHOULDER; COMPLETE, 2+        PLAN:   1. Patient's right humerus fracture improving. She will continue with PT working on mobility as tolerated. Risk factors include: HTN  2. No cortisone injection indicated today   3. Continue Physical/Occupational Therapy indicated today  4. No diagnostic test indicated today  5. No durable medical equipment indicated today  6. No referral indicated today   7. No medications indicated today  8. No Narcotic indicated today    RTC 4 weeks  Follow-up Disposition: Not on File    Scribed by Ivette Maki [facility]) as dictated by Lizandro Palmer MD    I, Dr. Lizandro Palmer, confirm that all documentation is accurate.     Lizandro Palmer M.D.   [facility]

## 2017-10-10 ENCOUNTER — OFFICE VISIT (OUTPATIENT)
Dept: ORTHOPEDIC SURGERY | Age: 77
End: 2017-10-10

## 2017-10-10 VITALS
HEIGHT: 60 IN | HEART RATE: 87 BPM | DIASTOLIC BLOOD PRESSURE: 57 MMHG | OXYGEN SATURATION: 89 % | WEIGHT: 173 LBS | TEMPERATURE: 98 F | BODY MASS INDEX: 33.96 KG/M2 | RESPIRATION RATE: 14 BRPM | SYSTOLIC BLOOD PRESSURE: 82 MMHG

## 2017-10-10 DIAGNOSIS — M25.511 RIGHT SHOULDER PAIN, UNSPECIFIED CHRONICITY: ICD-10-CM

## 2017-10-10 DIAGNOSIS — S42.291D FRACTURE OF HUMERAL HEAD, CLOSED, RIGHT, WITH ROUTINE HEALING, SUBSEQUENT ENCOUNTER: Primary | ICD-10-CM

## 2017-10-10 NOTE — PATIENT INSTRUCTIONS
Broken Arm: Care Instructions  Your Care Instructions  Fractures can range from a small, hairline crack, to a bone or bones broken into two or more pieces. Your treatment depends on how bad the break is. Your doctor may have put your arm in a splint or cast to allow it to heal or to keep it stable until you see another doctor. It may take weeks or months for your arm to heal. You can help your arm heal with some care at home. You heal best when you take good care of yourself. Eat a variety of healthy foods, and don't smoke. You may have had a sedative to help you relax. You may be unsteady after having sedation. It can take a few hours for the medicine's effects to wear off. Common side effects of sedation include nausea, vomiting, and feeling sleepy or tired. The doctor has checked you carefully, but problems can develop later. If you notice any problems or new symptoms, get medical treatment right away. Follow-up care is a key part of your treatment and safety. Be sure to make and go to all appointments, and call your doctor if you are having problems. It's also a good idea to know your test results and keep a list of the medicines you take. How can you care for yourself at home? · If the doctor gave you a sedative:  ¨ For 24 hours, don't do anything that requires attention to detail. It takes time for the medicine's effects to completely wear off. ¨ For your safety, do not drive or operate any machinery that could be dangerous. Wait until the medicine wears off and you can think clearly and react easily. · Put ice or a cold pack on your arm for 10 to 20 minutes at a time. Try to do this every 1 to 2 hours for the next 3 days (when you are awake). Put a thin cloth between the ice and your cast or splint. Keep the cast or splint dry. · Follow the cast care instructions your doctor gives you. If you have a splint, do not take it off unless your doctor tells you to. · Be safe with medicines.  Take pain medicines exactly as directed. ¨ If the doctor gave you a prescription medicine for pain, take it as prescribed. ¨ If you are not taking a prescription pain medicine, ask your doctor if you can take an over-the-counter medicine. · Prop up your arm on pillows when you sit or lie down in the first few days after the injury. Keep the arm higher than the level of your heart. This will help reduce swelling. · Follow instructions for exercises to keep your arm strong. · Wiggle your fingers and wrist often to reduce swelling and stiffness. When should you call for help? Call 911 anytime you think you may need emergency care. For example, call if:  · You have trouble breathing. · You passed out (lost consciousness). Call your doctor now or seek immediate medical care if:  · You have new or worse nausea or vomiting. · You have increased or severe pain. · Your hand is cool or pale or changes color. · You have tingling, weakness, or numbness in your hand or fingers. · Your cast or splint feels too tight. · You cannot move your fingers. · The skin under your cast or splint is burning or stinging. Watch closely for changes in your health, and be sure to contact your doctor if:  · You do not get better as expected. Where can you learn more? Go to http://christie-shae.info/. Enter D453 in the search box to learn more about \"Broken Arm: Care Instructions. \"  Current as of: March 21, 2017  Content Version: 11.3  © 9701-8186 [employer]. Care instructions adapted under license by [employer] (which disclaims liability or warranty for this information). If you have questions about a medical condition or this instruction, always ask your healthcare professional. [place] [address] any warranty or liability for your use of this information.

## 2017-10-10 NOTE — PROGRESS NOTES
[facility]  1940   Chief Complaint   Patient presents with    Arm Injury     Right        HISTORY OF PRESENT ILLNESS  Cecilia Becker is a 78 y.o. female who presents today for reevaluation of right shoulder pain. At last OV, patient instructed to continue PT. She rates her pain 10/10 today. Initial injury occurred 7/12/17 subsequent to a fall while at home. She has continued to go to PT. She states she fell last night, 10/9/17, and has had a significant increase in her shoulder pain since. Patient denies any fever, chills, chest pain, shortness of breath or calf pain. There are no new illness or injuries to report since last seen in the office. There are no changes to medications, allergies, family or social history. PHYSICAL EXAM:   Visit Vitals    BP (!) 82/57    Pulse 87    Temp 98 °F (36.7 °C) (Oral)    Resp 14    Ht 5' (1.524 m)    Wt 173 lb (78.5 kg)    SpO2 (!) 89%    BMI 33.79 kg/m2     The patient is a well-developed, well-nourished female   in no acute distress. The patient is alert and oriented times three. The patient is alert and oriented times three. Mood and affect are normal.  LYMPHATIC: lymph nodes are not enlarged and are within normal limits  SKIN: normal in color and non tender to palpation. There are no bruises or abrasions noted. NEUROLOGICAL: Motor sensory exam is within normal limits. Reflexes are equal bilaterally.  There is normal sensation to pinprick and light touch  MUSCULOSKELETAL:  Examination Right shoulder   Skin Intact   AC joint tenderness -   Biceps tenderness -   Forward flexion/Elevation ROM Unable to assess secondary to fx   Active abduction ROM Unable to assess secondary to fx   Glenohumeral abduction Unable to assess secondary to fx   External rotation ROM Unable to assess secondary to fx   Internal rotation ROM Unable to assess secondary to fx   Apprehension -   Lindas Relocation -   Jerk -   Load and Shift -   Obriens -   Speeds - Impingement sign -   Supraspinatus/Empty Can Unable to assess secondary to fx   External Rotation Strength Unable to assess secondary to fx   Lift Off/Belly Press Unable to assess secondary to fx   Neurovascular Intact      Improvement in swelling in hand, able to make a full fist     IMAGING:   XR of the right shoulder dated 10/10/17 was reviewed and read: Further subluxation anteriorly with further displacement, callus formation noted    XR of the right shoulder dated 9/12/17 was reviewed and read: Proximal humerus fracture with collapse of fracture site but significant callus formation     2 view xray from 8/2/17 of right humerus read and reviewed by myself reveal displaced proximal humerus fracture appears to be head splitting fracture no change in position. Some callus seen    IMPRESSION:      ICD-10-CM ICD-9-CM    1. Fracture of humeral head, closed, right, with routine healing, subsequent encounter S42.291D V54.11 CT SHOULDER RT WO CONT   2. Right shoulder pain, unspecified chronicity M25.511 719.41 AMB POC XRAY, SHOULDER; COMPLETE, 2+        PLAN:   1. Patient sustained a fall last night. I am concerned about further damage of the humeral head fracture. Risk factors include: HTN  2. No cortisone injection indicated today   3. No Physical/Occupational Therapy indicated today  4. Yes diagnostic test indicated today: CT RIGHT SHOULDER  5. No durable medical equipment indicated today  6. No referral indicated today   7. No medications indicated today  8. No Narcotic indicated today    RTC following completion of the CT  Follow-up Disposition: Not on File    Scribed by Jose Manuel Ramon [facility]) as dictated by Nima Benitez MD    I, Dr. Nima Benitez, confirm that all documentation is accurate.     Nima Benitez M.D.   [facility]

## 2017-10-13 ENCOUNTER — DOCUMENTATION ONLY (OUTPATIENT)
Dept: ORTHOPEDIC SURGERY | Age: 77
End: 2017-10-13

## 2017-10-24 ENCOUNTER — OFFICE VISIT (OUTPATIENT)
Dept: ORTHOPEDIC SURGERY | Age: 77
End: 2017-10-24

## 2017-10-24 VITALS
TEMPERATURE: 96.3 F | DIASTOLIC BLOOD PRESSURE: 86 MMHG | OXYGEN SATURATION: 95 % | SYSTOLIC BLOOD PRESSURE: 121 MMHG | HEART RATE: 87 BPM

## 2017-10-24 DIAGNOSIS — S42.291D FRACTURE OF HUMERAL HEAD, CLOSED, RIGHT, WITH ROUTINE HEALING, SUBSEQUENT ENCOUNTER: Primary | ICD-10-CM

## 2017-10-24 RX ORDER — CARVEDILOL 3.12 MG/1
TABLET ORAL 2 TIMES DAILY WITH MEALS
COMMUNITY

## 2017-10-24 NOTE — PROGRESS NOTES
[facility]  1940   Chief Complaint   Patient presents with    Follow-up     right arm    Other     CT Scan results        HISTORY OF PRESENT ILLNESS  Joyce Flowers is a 78 y.o. female who presents today for reevaluation of right shoulder pain and to review her CT results. She rates her pain 9/10 today. Initial injury occurred 7/12/17 subsequent to a fall while at home. She fell again on 10/9/17. She states she has not been able to manage the pain with medication. She is on oxygen. Patient denies any fever, chills, chest pain, shortness of breath or calf pain. There are no new illness or injuries to report since last seen in the office. There are no changes to medications, allergies, family or social history. PHYSICAL EXAM:   Visit Vitals    /86    Pulse 87    Temp 96.3 °F (35.7 °C) (Oral)    SpO2 95%  Comment: 2 liters of oxygen     The patient is a well-developed, well-nourished female   in no acute distress. The patient is alert and oriented times three. The patient is alert and oriented times three. Mood and affect are normal.  LYMPHATIC: lymph nodes are not enlarged and are within normal limits  SKIN: normal in color and non tender to palpation. There are no bruises or abrasions noted. NEUROLOGICAL: Motor sensory exam is within normal limits. Reflexes are equal bilaterally.  There is normal sensation to pinprick and light touch  MUSCULOSKELETAL:  Examination Right shoulder   Skin Intact   AC joint tenderness -   Biceps tenderness -   Forward flexion/Elevation ROM Unable to assess secondary to fx   Active abduction ROM Unable to assess secondary to fx   Glenohumeral abduction Unable to assess secondary to fx   External rotation ROM Unable to assess secondary to fx   Internal rotation ROM Unable to assess secondary to fx   Apprehension -   Lindas Relocation -   Jerk -   Load and Shift -   Obriens -   Speeds -   Impingement sign -   Supraspinatus/Empty Can Unable to assess secondary to fx   External Rotation Strength Unable to assess secondary to fx   Lift Off/Belly Press Unable to assess secondary to fx   Neurovascular Intact      Improvement in swelling in hand, able to make a full fist     IMAGING:   XR of the right shoulder dated 10/18/17 was reviewed and read:   IMPRESSION:  1. 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 2. Moderate glenohumeral degenerative joint disease with asymmetric posterior bone-on-bone contact probably from secondary instability. Intraarticular axillary recess osseous bodies either from the arthritis or prior shearing of the anatomic head cortex. 3. Mild in particular supraspinatus muscle atrophy. Probable small subacromial-subdeltoid bursal effusion  4. Mild acromioclavicular osteoarthritis. XR of the right shoulder dated 10/10/17 was reviewed and read: Further subluxation anteriorly with further displacement, callus formation noted    XR of the right shoulder dated 9/12/17 was reviewed and read: Proximal humerus fracture with collapse of fracture site but significant callus formation     2 view xray from 8/2/17 of right humerus read and reviewed by myself reveal displaced proximal humerus fracture appears to be head splitting fracture no change in position. Some callus seen    IMPRESSION:      ICD-10-CM ICD-9-CM    1. Fracture of humeral head, closed, right, with routine healing, subsequent encounter S42.291D V54.11         PLAN:   1. I discussed the results of the CT and the treatment options with the patient. Due to the patient's medical history, I am concerned about proceeding with surgery.  Before we can proceed with surgical intervention she would need to be seen by her pulmonologist. She will follow up once she has received clearance. Risk factors include: HTN  2. No cortisone injection indicated today   3. No Physical/Occupational Therapy indicated today  4. No diagnostic test indicated today  5. No durable medical equipment indicated today  6. No referral indicated today   7. No medications indicated today  8. No Narcotic indicated today    RTC PRN  Follow-up Disposition: Not on File    Scribed by Neel Marshall [facility]) as dictated by Cj Espitia MD    I, DrDianne Espitia, confirm that all documentation is accurate.     Cj Espitia M.D.   [facility]

## 2017-11-03 DIAGNOSIS — S42.291D FRACTURE OF HUMERAL HEAD, CLOSED, RIGHT, WITH ROUTINE HEALING, SUBSEQUENT ENCOUNTER: ICD-10-CM
